# Patient Record
Sex: FEMALE | Race: BLACK OR AFRICAN AMERICAN | NOT HISPANIC OR LATINO | Employment: FULL TIME | URBAN - METROPOLITAN AREA
[De-identification: names, ages, dates, MRNs, and addresses within clinical notes are randomized per-mention and may not be internally consistent; named-entity substitution may affect disease eponyms.]

---

## 2017-08-26 ENCOUNTER — OFFICE VISIT (OUTPATIENT)
Dept: URGENT CARE | Facility: URGENT CARE | Age: 27
End: 2017-08-26
Payer: COMMERCIAL

## 2017-08-26 VITALS
WEIGHT: 190 LBS | TEMPERATURE: 98.4 F | DIASTOLIC BLOOD PRESSURE: 84 MMHG | BODY MASS INDEX: 28.63 KG/M2 | SYSTOLIC BLOOD PRESSURE: 146 MMHG | OXYGEN SATURATION: 96 % | HEART RATE: 96 BPM

## 2017-08-26 DIAGNOSIS — B96.89 BACTERIAL VAGINITIS: ICD-10-CM

## 2017-08-26 DIAGNOSIS — N89.8 VAGINAL DISCHARGE: Primary | ICD-10-CM

## 2017-08-26 DIAGNOSIS — N76.0 BACTERIAL VAGINITIS: ICD-10-CM

## 2017-08-26 LAB
SPECIMEN SOURCE: ABNORMAL
WET PREP SPEC: ABNORMAL

## 2017-08-26 PROCEDURE — 87210 SMEAR WET MOUNT SALINE/INK: CPT | Performed by: NURSE PRACTITIONER

## 2017-08-26 PROCEDURE — 99213 OFFICE O/P EST LOW 20 MIN: CPT | Performed by: NURSE PRACTITIONER

## 2017-08-26 RX ORDER — METRONIDAZOLE 500 MG/1
500 TABLET ORAL 2 TIMES DAILY
Qty: 14 TABLET | Refills: 0 | Status: SHIPPED | OUTPATIENT
Start: 2017-08-26 | End: 2017-09-02

## 2017-08-26 NOTE — NURSING NOTE
"Chief Complaint   Patient presents with     Vaginal Problem     Pt c/o vaginal problem.        Initial /84 (BP Location: Left arm, Patient Position: Chair, Cuff Size: Adult Regular)  Pulse 96  Temp 98.4  F (36.9  C) (Oral)  Wt 190 lb (86.2 kg)  SpO2 96%  Breastfeeding? No  BMI 28.63 kg/m2 Estimated body mass index is 28.63 kg/(m^2) as calculated from the following:    Height as of 3/20/16: 5' 8.31\" (1.735 m).    Weight as of this encounter: 190 lb (86.2 kg).  Medication Reconciliation: complete     Altagracia Allen CMA (AAMA)      "

## 2017-08-26 NOTE — MR AVS SNAPSHOT
After Visit Summary   2017    Aubree Monsivais    MRN: [id number]           Patient Information     Date Of Birth          1990        Visit Information        Provider Department      2017 9:05 AM Cami Meza NP [facility]        Today's Diagnoses     Vaginal discharge    -  1    Bacterial vaginitis          Care Instructions      Bacterial Vaginosis    You have a vaginal infection called bacterial vaginosis (BV). Both good and bad bacteria are present in a healthy vagina. BV occurs when these bacteria get out of balance. The number of bad bacteria increase. And the number of good bacteria decrease.  BV may or may not cause symptoms. If symptoms do occur, they can include:    Thin, gray, milky-white, or sometimes green discharge    Unpleasant odor or  fishy  smell    Itching, burning, or pain in or around the vagina  It is not known what causes BV, but certain factors can make the problem more likely. This can include:    Douching    Having sex with a new partner    Having sex with more than one partner  BV will sometimes go away on its own. But treatment is usually recommended. This is because untreated BV can increase the risk of more serious health problems such as:    Pelvic inflammatory disease (PID)     delivery (giving birth to a baby early if you re pregnant)    HIV and certain other sexually transmitted diseases (STDs)    Infection after surgery on the reproductive organs  Home care  General care    BV is most often treated with medicines called antibiotics. These may be given as pills or as a vaginal cream. If antibiotics are prescribed, be sure to use them exactly as directed. Also, be sure to complete all of the medicine, even if your symptoms go away.    Avoid douching or having sex during treatment.    If you have sex with a female partner, ask your healthcare provider if she should also be treated.  Prevention    Limit or avoid  douching.    Avoid having sex. If you do have sex, then take steps to lower your risk:    Use condoms when having sex.    Limit the number of partners you have sex with.  Follow-up care  Follow up with your healthcare provider, or as advised.  When to seek medical advice  Call your healthcare provider right away if:    You have a fever of 100.4 F (38 C) or higher, or as directed by your provider.    Your symptoms worsen, or they don t go away within a few days of starting treatment.    You have new pain in the lower belly or pelvic region.    You have side effects that bother you or a reaction to the pills or cream you re prescribed.    You or any partners you have sex with have new symptoms, such as a rash, joint pain, or sores.  Date Last Reviewed: 7/30/2015 2000-2017 [employer]. [address] [place] [address]. All rights reserved. This information is not intended as a substitute for professional medical care. Always follow your healthcare professional's instructions.                Follow-ups after your visit        Who to contact     If you have questions or need follow up information about today's clinic visit or your schedule please contact [facility] directly at [phone].  Normal or non-critical lab and imaging results will be communicated to you by MyChart, letter or phone within 4 business days after the clinic has received the results. If you do not hear from us within 7 days, please contact the clinic through [employer] or phone. If you have a critical or abnormal lab result, we will notify you by phone as soon as possible.  Submit refill requests through [employer] or call your pharmacy and they will forward the refill request to us. Please allow 3 business days for your refill to be completed.          Additional Information About Your Visit        [employer] Information     [employer] lets you send messages to your doctor, view your test results, renew your  "prescriptions, schedule appointments and more. To sign up, go to [email or web address] . Click on \"Log in\" on the left side of the screen, which will take you to the Welcome page. Then click on \"Sign up Now\" on the right side of the page.     You will be asked to enter the access code listed below, as well as some personal information. Please follow the directions to create your username and password.     Your access code is: [email or web address]  Expires: 2017  9:42 AM     Your access code will  in 90 days. If you need help or a new code, please call your [facility] clinic or [phone].        Care EveryWhere ID     This is your Care EveryWhere ID. This could be used by other organizations to access your [facility] medical records  [id number]        Your Vitals Were     Pulse Temperature Pulse Oximetry Breastfeeding? BMI (Body Mass Index)       96 98.4  F (36.9  C) (Oral) 96% No 28.63 kg/m2        Blood Pressure from Last 3 Encounters:   17 146/84   16 116/78   09 102/60    Weight from Last 3 Encounters:   17 190 lb (86.2 kg)   16 184 lb 3.2 oz (83.6 kg)   09 146 lb (66.2 kg) (79 %)*     * Growth percentiles are based on [facility] 2-20 Years data.              We Performed the Following     Wet prep          Today's Medication Changes          These changes are accurate as of: 17  9:42 AM.  If you have any questions, ask your nurse or doctor.               Start taking these medicines.        Dose/Directions    metroNIDAZOLE 500 MG tablet   Commonly known as:  FLAGYL   Used for:  Bacterial vaginitis   Started by:  Cami Meza NP        Dose:  500 mg   Take 1 tablet (500 mg) by mouth 2 times daily for 7 days   Quantity:  14 tablet   Refills:  0            Where to get your medicines      These medications were sent to [id number] - [facility], [place] - [address] [facility]  [address] [address]    Hours:  " 24-hours Phone:  [phone]     metroNIDAZOLE 500 MG tablet                Primary Care Provider    None Specified       No primary provider on file.        Equal Access to Services     CATHY PFEIFFER : Hadii aad ku hadkathyamanda Jessicajil, vida hedysharif, dickson mariee, huong karimein hayaan emanuelchata concepcion laOlesyaramon dano. So [facility] [phone].    ATENCIÓN: Si habla español, tiene a baca disposición servicios gratuitos de asistencia lingüística. Llame al [phone].    We comply with applicable federal civil rights laws and [place] laws. We do not discriminate on the basis of race, color, national origin, age, disability sex, sexual orientation or gender identity.            Thank you!     Thank you for choosing [facility]  for your care. Our goal is always to provide you with excellent care. Hearing back from our patients is one way we can continue to improve our services. Please take a few minutes to complete the written survey that you may receive in the mail after your visit with us. Thank you!             Your Updated Medication List - Protect others around you: Learn how to safely use, store and throw away your medicines at www.disposemymeds.org.          This list is accurate as of: 8/26/17  9:42 AM.  Always use your most recent med list.                   Brand Name Dispense Instructions for use Diagnosis    metroNIDAZOLE 500 MG tablet    FLAGYL    14 tablet    Take 1 tablet (500 mg) by mouth 2 times daily for 7 days    Bacterial vaginitis       MIRENA (52 MG) 20 MCG/24HR IUD   Generic drug:  levonorgestrel      None Entered        prenatal multivitamin plus iron 27-0.8 MG Tabs per tablet      Take 1 tablet by mouth daily

## 2017-08-26 NOTE — PATIENT INSTRUCTIONS
Bacterial Vaginosis    You have a vaginal infection called bacterial vaginosis (BV). Both good and bad bacteria are present in a healthy vagina. BV occurs when these bacteria get out of balance. The number of bad bacteria increase. And the number of good bacteria decrease.  BV may or may not cause symptoms. If symptoms do occur, they can include:    Thin, gray, milky-white, or sometimes green discharge    Unpleasant odor or  fishy  smell    Itching, burning, or pain in or around the vagina  It is not known what causes BV, but certain factors can make the problem more likely. This can include:    Douching    Having sex with a new partner    Having sex with more than one partner  BV will sometimes go away on its own. But treatment is usually recommended. This is because untreated BV can increase the risk of more serious health problems such as:    Pelvic inflammatory disease (PID)     delivery (giving birth to a baby early if you re pregnant)    HIV and certain other sexually transmitted diseases (STDs)    Infection after surgery on the reproductive organs  Home care  General care    BV is most often treated with medicines called antibiotics. These may be given as pills or as a vaginal cream. If antibiotics are prescribed, be sure to use them exactly as directed. Also, be sure to complete all of the medicine, even if your symptoms go away.    Avoid douching or having sex during treatment.    If you have sex with a female partner, ask your healthcare provider if she should also be treated.  Prevention    Limit or avoid douching.    Avoid having sex. If you do have sex, then take steps to lower your risk:    Use condoms when having sex.    Limit the number of partners you have sex with.  Follow-up care  Follow up with your healthcare provider, or as advised.  When to seek medical advice  Call your healthcare provider right away if:    You have a fever of 100.4 F (38 C) or higher, or as directed by your  provider.    Your symptoms worsen, or they don t go away within a few days of starting treatment.    You have new pain in the lower belly or pelvic region.    You have side effects that bother you or a reaction to the pills or cream you re prescribed.    You or any partners you have sex with have new symptoms, such as a rash, joint pain, or sores.  Date Last Reviewed: 7/30/2015 2000-2017 [employer]. [address] [place] [address], [place] [address]. All rights reserved. This information is not intended as a substitute for professional medical care. Always follow your healthcare professional's instructions.

## 2017-10-20 ENCOUNTER — OFFICE VISIT (OUTPATIENT)
Dept: URGENT CARE | Facility: URGENT CARE | Age: 27
End: 2017-10-20
Payer: COMMERCIAL

## 2017-10-20 VITALS
TEMPERATURE: 98.4 F | SYSTOLIC BLOOD PRESSURE: 121 MMHG | OXYGEN SATURATION: 100 % | HEART RATE: 63 BPM | BODY MASS INDEX: 28.63 KG/M2 | DIASTOLIC BLOOD PRESSURE: 74 MMHG | WEIGHT: 190 LBS

## 2017-10-20 DIAGNOSIS — N89.8 VAGINAL DISCHARGE: Primary | ICD-10-CM

## 2017-10-20 DIAGNOSIS — L24.9 IRRITANT CONTACT DERMATITIS, UNSPECIFIED TRIGGER: ICD-10-CM

## 2017-10-20 DIAGNOSIS — N76.0 BACTERIAL VAGINITIS: ICD-10-CM

## 2017-10-20 DIAGNOSIS — B96.89 BACTERIAL VAGINITIS: ICD-10-CM

## 2017-10-20 LAB
ALBUMIN UR-MCNC: NEGATIVE MG/DL
APPEARANCE UR: ABNORMAL
BILIRUB UR QL STRIP: NEGATIVE
COLOR UR AUTO: YELLOW
GLUCOSE UR STRIP-MCNC: NEGATIVE MG/DL
HGB UR QL STRIP: NEGATIVE
KETONES UR STRIP-MCNC: NEGATIVE MG/DL
LEUKOCYTE ESTERASE UR QL STRIP: NEGATIVE
NITRATE UR QL: NEGATIVE
NON-SQ EPI CELLS #/AREA URNS LPF: ABNORMAL /LPF
PH UR STRIP: 6 PH (ref 5–7)
RBC #/AREA URNS AUTO: ABNORMAL /HPF
SOURCE: ABNORMAL
SP GR UR STRIP: 1.02 (ref 1–1.03)
SPECIMEN SOURCE: ABNORMAL
UROBILINOGEN UR STRIP-ACNC: 0.2 EU/DL (ref 0.2–1)
WBC #/AREA URNS AUTO: ABNORMAL /HPF
WET PREP SPEC: ABNORMAL

## 2017-10-20 PROCEDURE — 81001 URINALYSIS AUTO W/SCOPE: CPT | Performed by: NURSE PRACTITIONER

## 2017-10-20 PROCEDURE — 87210 SMEAR WET MOUNT SALINE/INK: CPT | Performed by: NURSE PRACTITIONER

## 2017-10-20 PROCEDURE — 99214 OFFICE O/P EST MOD 30 MIN: CPT | Performed by: NURSE PRACTITIONER

## 2017-10-20 RX ORDER — PREDNISONE 20 MG/1
20 TABLET ORAL 2 TIMES DAILY
Qty: 10 TABLET | Refills: 0 | Status: SHIPPED | OUTPATIENT
Start: 2017-10-20 | End: 2017-10-25

## 2017-10-20 RX ORDER — METRONIDAZOLE 500 MG/1
500 TABLET ORAL 2 TIMES DAILY
Qty: 14 TABLET | Refills: 0 | Status: SHIPPED | OUTPATIENT
Start: 2017-10-20 | End: 2017-10-27

## 2017-10-20 RX ORDER — HYDROXYZINE HYDROCHLORIDE 25 MG/1
25-50 TABLET, FILM COATED ORAL EVERY 6 HOURS PRN
Qty: 42 TABLET | Refills: 0 | Status: SHIPPED | OUTPATIENT
Start: 2017-10-20 | End: 2017-10-27

## 2017-10-20 RX ORDER — TRIAMCINOLONE ACETONIDE 1 MG/G
CREAM TOPICAL
Qty: 80 G | Refills: 0 | Status: SHIPPED | OUTPATIENT
Start: 2017-10-20 | End: 2020-02-23

## 2017-10-20 NOTE — PATIENT INSTRUCTIONS
Bacterial Vaginosis    You have a vaginal infection called bacterial vaginosis (BV). Both good and bad bacteria are present in a healthy vagina. BV occurs when these bacteria get out of balance. The number of bad bacteria increase. And the number of good bacteria decrease.  BV may or may not cause symptoms. If symptoms do occur, they can include:    Thin, gray, milky-white, or sometimes green discharge    Unpleasant odor or  fishy  smell    Itching, burning, or pain in or around the vagina  It is not known what causes BV, but certain factors can make the problem more likely. This can include:    Douching    Having sex with a new partner    Having sex with more than one partner  BV will sometimes go away on its own. But treatment is usually recommended. This is because untreated BV can increase the risk of more serious health problems such as:    Pelvic inflammatory disease (PID)     delivery (giving birth to a baby early if you re pregnant)    HIV and certain other sexually transmitted diseases (STDs)    Infection after surgery on the reproductive organs  Home care  General care    BV is most often treated with medicines called antibiotics. These may be given as pills or as a vaginal cream. If antibiotics are prescribed, be sure to use them exactly as directed. Also, be sure to complete all of the medicine, even if your symptoms go away.    Avoid douching or having sex during treatment.    If you have sex with a female partner, ask your healthcare provider if she should also be treated.  Prevention    Limit or avoid douching.    Avoid having sex. If you do have sex, then take steps to lower your risk:    Use condoms when having sex.    Limit the number of partners you have sex with.  Follow-up care  Follow up with your healthcare provider, or as advised.  When to seek medical advice  Call your healthcare provider right away if:    You have a fever of 100.4 F (38 C) or higher, or as directed by your  "provider.    Your symptoms worsen, or they don t go away within a few days of starting treatment.    You have new pain in the lower belly or pelvic region.    You have side effects that bother you or a reaction to the pills or cream you re prescribed.    You or any partners you have sex with have new symptoms, such as a rash, joint pain, or sores.  Date Last Reviewed: 7/30/2015 2000-2017 [employer]. [address] [place] [address], [place] [address]. All rights reserved. This information is not intended as a substitute for professional medical care. Always follow your healthcare professional's instructions.        Contact Dermatitis  Contact dermatitis is a skin rash caused by something that touches the skin and makes it irritated and inflamed. Your skin may be red, swollen, dry, and may be cracked. Blisters may form and ooze. The rash will itch.  Contact dermatitis can form on the face and neck, backs of hands, forearms, genitals, and lower legs.  People can get contact dermatitis from lots of sources. These include:    Plants such as poison ivy, oak, or sumac    Chemicals in hair dyes and rinses, soaps, solvents, waxes, fingernail polish, and deodorants     Jewelry or watchbands made of nickel  Contact dermatitis is not passed from person to person.  Talk with your healthcare provider about what may have caused the rash. A type of allergy testing called \"patch testing\" may be used to discover what you are allergic to. You will need to avoid the source of your rash in the future to prevent it from coming back.  Treatment is done to relieve itching and prevent the rash from coming back. The rash should go away in a few days to a few weeks.  Home care  Your healthcare provider may prescribe medicine to relieve swelling and itching. Follow all instructions when using these medicines.  General care:    Avoid anything that heats up your skin, such as hot showers or baths, or direct sunlight. This can make " itching worse.    Apply cold compresses to soothe your sores to help relieve your symptoms. Do this for 30 minutes 3 to 4 times a day. You can make a cold compress by soaking a cloth in cold water. Squeeze out excess water. You can add colloidal oatmeal to the water to help reduce itching. For severe itching in a small area, apply an ice pack wrapped in a thin towel. Do this for 20 minutes 3 to 4 times a day.    You can also try wet dressings. One way to do this is to wear a wet piece of clothing under a dry one. Wear a damp shirt under a dry shirt if your upper body is affected. This can relieve itching and prevent you from scratching the affected area.    You can also help relieve large areas of itching by taking a lukewarm bath with colloidal oatmeal added to the water.    Use hydrocortisone cream for redness and irritation, unless another medicine was prescribed. You can also use benzocaine anesthetic cream or spray. Calamine lotion can also relieve mild symptoms.    Use oral diphenhydramine to help reduce itching. You can buy this antihistamine at drug and grocery stores. It can make you sleepy, so use lower doses during the daytime. Or you can use loratadine. This is an antihistamine that will not make you sleepy. Do not use diphenhydramine if you have glaucoma or have trouble urinating due to an enlarged prostate.    If a plant causes your rash, make sure to wash your skin and the clothes you were wearing when you came into contact with the plant. This is to wash away the plant oils that gave you the rash and prevent more or worse symptoms.    Stay away from the substance or object that causes your symptoms. If you can t avoid it, wear gloves or some other type of protection.  Follow-up care  Follow up with your healthcare provider, or as advised.  When to seek medical advice  Call your healthcare provider right away if any of these occur:    Spreading of the rash to other parts of your body    Severe  swelling of your face, eyelids, mouth, throat or tongue    Trouble urinating due to swelling in the genital area    Fever of 100.4 F (38 C) or higher    Redness or swelling that gets worse    Pain that gets worse    Foul-smelling fluid leaking from the skin    Yellow-brown crusts on the open blisters  Date Last Reviewed: 9/1/2016 2000-2017 [employer]. [address] [place] [address]. All rights reserved. This information is not intended as a substitute for professional medical care. Always follow your healthcare professional's instructions.

## 2017-10-20 NOTE — PROGRESS NOTES
"  SUBJECTIVE:   Aubree Monsivais is a 27 year old female who presents to clinic today for the following health issues:    Vaginal Symptoms      Duration: 4 days    Description  Vaginal discharge, itching, some \"stinging\" while urinating.     Intensity:  moderate    Accompanying signs and symptoms (fever/dysuria/abdominal or back pain): None    History  Sexually active: yes, single partner, contraception - oral contraceptives (combined)  Possibility of pregnancy: No  Recent antibiotic use: no     Precipitating or alleviating factors: None    Therapies tried and outcome: tried an OTC cream ( monistat?)- no relief.Outcome:         Rash      Duration: over 1.5 months    Description  Location: abdomen, upper arms, back, vagina. Has been spreading.   Itching: severe    Intensity:  Moderate-severe    Accompanying signs and symptoms: None    History (similar episodes/previous evaluation): None    Precipitating or alleviating factors:  New exposures:  None  Recent travel: no      Therapies tried and outcome: benadryl- some relief.        Problem list and histories reviewed & adjusted, as indicated.  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 B Streptococcus     Past Surgical History:   Procedure Laterality Date     NO HISTORY OF SURGERY         Social History   Substance Use Topics     Smoking status: Former Smoker     Smokeless tobacco: Not on file     Alcohol use No     Family History   Problem Relation Age of Onset     Hypertension Mother      DIABETES Father      Type II         Current Outpatient Prescriptions   Medication Sig Dispense Refill     DiphenhydrAMINE HCl (BENADRYL PO)        triamcinolone (KENALOG) 0.1 % cream Apply sparingly to affected area three times daily as needed 80 g 0     predniSONE (DELTASONE) 20 MG tablet Take 1 tablet (20 mg) by mouth 2 times daily for 5 days 10 tablet 0     hydrOXYzine (ATARAX) 25 MG tablet Take 1-2 " tablets (25-50 mg) by mouth every 6 hours as needed for itching 42 tablet 0     metroNIDAZOLE (FLAGYL) 500 MG tablet Take 1 tablet (500 mg) by mouth 2 times daily for 7 days 14 tablet 0     Prenatal Vit-Fe Fumarate-FA (PRENATAL MULTIVITAMIN  PLUS IRON) 27-0.8 MG TABS Take 1 tablet by mouth daily       MIRENA 20 MCG/24HR IU IUD None Entered       No Known Allergies      Reviewed and updated as needed this visit by clinical staff     Reviewed and updated as needed this visit by Provider         ROS:  C: NEGATIVE for fever, chills, change in weight  E/M: NEGATIVE for ear, mouth and throat problems  R: NEGATIVE for significant cough or SOB  CV: NEGATIVE for chest pain, palpitations or peripheral edema  : vaginal discharge  Skin: Positive for rash    OBJECTIVE:     /74 (BP Location: Left arm, Patient Position: Chair, Cuff Size: Adult Regular)  Pulse 63  Temp 98.4  F (36.9  C) (Oral)  Wt 190 lb (86.2 kg)  SpO2 100%  Breastfeeding? No  BMI 28.63 kg/m2  Body mass index is 28.63 kg/(m^2).  GENERAL: healthy, alert and no distress  NECK: no adenopathy, no asymmetry, masses, or scars and thyroid normal to palpation  RESP: lungs clear to auscultation - no rales, rhonchi or wheezes  CV: regular rate and rhythm, normal S1 S2, no S3 or S4, no murmur, click or rub, no peripheral edema and peripheral pulses strong  ABDOMEN: soft, nontender, no hepatosplenomegaly, no masses and bowel sounds normal  MS: no gross musculoskeletal defects noted, no edema  SKIN: Erythematous pruiritic maculopapular rashes with vesicles on the abdomen and back.     Diagnostic Test Results:  Results for orders placed or performed in visit on 10/20/17 (from the past 24 hour(s))   UA with Microscopic reflex to Culture   Result Value Ref Range    Color Urine Yellow     Appearance Urine Slightly Cloudy     Glucose Urine Negative NEG^Negative mg/dL    Bilirubin Urine Negative NEG^Negative    Ketones Urine Negative NEG^Negative mg/dL    Specific  Gravity Urine 1.025 1.003 - 1.035    pH Urine 6.0 5.0 - 7.0 pH    Protein Albumin Urine Negative NEG^Negative mg/dL    Urobilinogen Urine 0.2 0.2 - 1.0 EU/dL    Nitrite Urine Negative NEG^Negative    Blood Urine Negative NEG^Negative    Leukocyte Esterase Urine Negative NEG^Negative    Source Midstream Urine     WBC Urine O - 2 OTO2^O - 2 /HPF    RBC Urine O - 2 OTO2^O - 2 /HPF    Squamous Epithelial /LPF Urine Moderate (A) FEW^Few /LPF   Wet prep   Result Value Ref Range    Specimen Description Vagina     Wet Prep Clue cells seen (A)     Wet Prep No Trichomonas seen     Wet Prep No yeast seen     Wet Prep (Note)  SELF COLLECT.            ASSESSMENT/PLAN:       ICD-10-CM    1. Vaginal discharge N89.8 Wet prep     UA with Microscopic reflex to Culture   2. Irritant contact dermatitis, unspecified trigger L24.9 triamcinolone (KENALOG) 0.1 % cream     predniSONE (DELTASONE) 20 MG tablet     hydrOXYzine (ATARAX) 25 MG tablet   3. Bacterial vaginitis N76.0 metroNIDAZOLE (FLAGYL) 500 MG tablet    B96.89        I discussed lab results with the patient.  Advised absolute no alcohol while on flagyl  Advised follow up with PCP if rashes on skin are not resolving.   Patient educational/instructional material provided including reasons for follow-up    The patient indicates understanding of these issues and agrees with the plan.      Cami Meza NP  [facility]

## 2017-10-20 NOTE — NURSING NOTE
"Chief Complaint   Patient presents with     Vaginal Problem     Pt c/o vaginal problem and rash.        Initial /74 (BP Location: Left arm, Patient Position: Chair, Cuff Size: Adult Regular)  Pulse 63  Temp 98.4  F (36.9  C) (Oral)  Wt 190 lb (86.2 kg)  SpO2 100%  Breastfeeding? No  BMI 28.63 kg/m2 Estimated body mass index is 28.63 kg/(m^2) as calculated from the following:    Height as of 3/20/16: 5' 8.31\" (1.735 m).    Weight as of this encounter: 190 lb (86.2 kg).  Medication Reconciliation: complete     Altagracia Allen CMA (AAMA)      "

## 2017-10-20 NOTE — MR AVS SNAPSHOT
After Visit Summary   10/20/2017    Aubree Monsivais    MRN: [id number]           Patient Information     Date Of Birth          1990        Visit Information        Provider Department      10/20/2017 3:55 PM Cami Meza NP [facility]        Today's Diagnoses     Vaginal discharge    -  1    Irritant contact dermatitis, unspecified trigger        Bacterial vaginitis          Care Instructions      Bacterial Vaginosis    You have a vaginal infection called bacterial vaginosis (BV). Both good and bad bacteria are present in a healthy vagina. BV occurs when these bacteria get out of balance. The number of bad bacteria increase. And the number of good bacteria decrease.  BV may or may not cause symptoms. If symptoms do occur, they can include:    Thin, gray, milky-white, or sometimes green discharge    Unpleasant odor or  fishy  smell    Itching, burning, or pain in or around the vagina  It is not known what causes BV, but certain factors can make the problem more likely. This can include:    Douching    Having sex with a new partner    Having sex with more than one partner  BV will sometimes go away on its own. But treatment is usually recommended. This is because untreated BV can increase the risk of more serious health problems such as:    Pelvic inflammatory disease (PID)     delivery (giving birth to a baby early if you re pregnant)    HIV and certain other sexually transmitted diseases (STDs)    Infection after surgery on the reproductive organs  Home care  General care    BV is most often treated with medicines called antibiotics. These may be given as pills or as a vaginal cream. If antibiotics are prescribed, be sure to use them exactly as directed. Also, be sure to complete all of the medicine, even if your symptoms go away.    Avoid douching or having sex during treatment.    If you have sex with a female partner, ask your healthcare provider if she should also  "be treated.  Prevention    Limit or avoid douching.    Avoid having sex. If you do have sex, then take steps to lower your risk:    Use condoms when having sex.    Limit the number of partners you have sex with.  Follow-up care  Follow up with your healthcare provider, or as advised.  When to seek medical advice  Call your healthcare provider right away if:    You have a fever of 100.4 F (38 C) or higher, or as directed by your provider.    Your symptoms worsen, or they don t go away within a few days of starting treatment.    You have new pain in the lower belly or pelvic region.    You have side effects that bother you or a reaction to the pills or cream you re prescribed.    You or any partners you have sex with have new symptoms, such as a rash, joint pain, or sores.  Date Last Reviewed: 7/30/2015 2000-2017 [employer]. [address] [place] [address]. All rights reserved. This information is not intended as a substitute for professional medical care. Always follow your healthcare professional's instructions.        Contact Dermatitis  Contact dermatitis is a skin rash caused by something that touches the skin and makes it irritated and inflamed. Your skin may be red, swollen, dry, and may be cracked. Blisters may form and ooze. The rash will itch.  Contact dermatitis can form on the face and neck, backs of hands, forearms, genitals, and lower legs.  People can get contact dermatitis from lots of sources. These include:    Plants such as poison ivy, oak, or sumac    Chemicals in hair dyes and rinses, soaps, solvents, waxes, fingernail polish, and deodorants     Jewelry or watchbands made of nickel  Contact dermatitis is not passed from person to person.  Talk with your healthcare provider about what may have caused the rash. A type of allergy testing called \"patch testing\" may be used to discover what you are allergic to. You will need to avoid the source of your rash in the future to " prevent it from coming back.  Treatment is done to relieve itching and prevent the rash from coming back. The rash should go away in a few days to a few weeks.  Home care  Your healthcare provider may prescribe medicine to relieve swelling and itching. Follow all instructions when using these medicines.  General care:    Avoid anything that heats up your skin, such as hot showers or baths, or direct sunlight. This can make itching worse.    Apply cold compresses to soothe your sores to help relieve your symptoms. Do this for 30 minutes 3 to 4 times a day. You can make a cold compress by soaking a cloth in cold water. Squeeze out excess water. You can add colloidal oatmeal to the water to help reduce itching. For severe itching in a small area, apply an ice pack wrapped in a thin towel. Do this for 20 minutes 3 to 4 times a day.    You can also try wet dressings. One way to do this is to wear a wet piece of clothing under a dry one. Wear a damp shirt under a dry shirt if your upper body is affected. This can relieve itching and prevent you from scratching the affected area.    You can also help relieve large areas of itching by taking a lukewarm bath with colloidal oatmeal added to the water.    Use hydrocortisone cream for redness and irritation, unless another medicine was prescribed. You can also use benzocaine anesthetic cream or spray. Calamine lotion can also relieve mild symptoms.    Use oral diphenhydramine to help reduce itching. You can buy this antihistamine at drug and grocery stores. It can make you sleepy, so use lower doses during the daytime. Or you can use loratadine. This is an antihistamine that will not make you sleepy. Do not use diphenhydramine if you have glaucoma or have trouble urinating due to an enlarged prostate.    If a plant causes your rash, make sure to wash your skin and the clothes you were wearing when you came into contact with the plant. This is to wash away the plant oils that  gave you the rash and prevent more or worse symptoms.    Stay away from the substance or object that causes your symptoms. If you can t avoid it, wear gloves or some other type of protection.  Follow-up care  Follow up with your healthcare provider, or as advised.  When to seek medical advice  Call your healthcare provider right away if any of these occur:    Spreading of the rash to other parts of your body    Severe swelling of your face, eyelids, mouth, throat or tongue    Trouble urinating due to swelling in the genital area    Fever of 100.4 F (38 C) or higher    Redness or swelling that gets worse    Pain that gets worse    Foul-smelling fluid leaking from the skin    Yellow-brown crusts on the open blisters  Date Last Reviewed: 9/1/2016 2000-2017 The "[employer]". [address] [place] [address]. All rights reserved. This information is not intended as a substitute for professional medical care. Always follow your healthcare professional's instructions.                Follow-ups after your visit        Who to contact     If you have questions or need follow up information about today's clinic visit or your schedule please contact [facility] directly at [phone].  Normal or non-critical lab and imaging results will be communicated to you by Handyhart, letter or phone within 4 business days after the clinic has received the results. If you do not hear from us within 7 days, please contact the clinic through Handyhart or phone. If you have a critical or abnormal lab result, we will notify you by phone as soon as possible.  Submit refill requests through SKURA or call your pharmacy and they will forward the refill request to us. Please allow 3 business days for your refill to be completed.          Additional Information About Your Visit        HandyharPrimeloop Information     SKURA lets you send messages to your doctor, view your test results, renew your prescriptions,  "schedule appointments and more. To sign up, go to [email or web address] . Click on \"Log in\" on the left side of the screen, which will take you to the Welcome page. Then click on \"Sign up Now\" on the right side of the page.     You will be asked to enter the access code listed below, as well as some personal information. Please follow the directions to create your username and password.     Your access code is: [email or web address]  Expires: 2017  9:42 AM     Your access code will  in 90 days. If you need help or a new code, please call your [facility] clinic or [phone].        Care EveryWhere ID     This is your Care EveryWhere ID. This could be used by other organizations to access your [facility] medical records  [id number]        Your Vitals Were     Pulse Temperature Pulse Oximetry Breastfeeding? BMI (Body Mass Index)       63 98.4  F (36.9  C) (Oral) 100% No 28.63 kg/m2        Blood Pressure from Last 3 Encounters:   10/20/17 121/74   17 146/84   16 116/78    Weight from Last 3 Encounters:   10/20/17 190 lb (86.2 kg)   17 190 lb (86.2 kg)   16 184 lb 3.2 oz (83.6 kg)              We Performed the Following     UA with Microscopic reflex to Culture     Wet prep          Today's Medication Changes          These changes are accurate as of: 10/20/17  4:58 PM.  If you have any questions, ask your nurse or doctor.               Start taking these medicines.        Dose/Directions    hydrOXYzine 25 MG tablet   Commonly known as:  ATARAX   Used for:  Irritant contact dermatitis, unspecified trigger   Started by:  Cami Meza NP        Dose:  25-50 mg   Take 1-2 tablets (25-50 mg) by mouth every 6 hours as needed for itching   Quantity:  42 tablet   Refills:  0       metroNIDAZOLE 500 MG tablet   Commonly known as:  FLAGYL   Used for:  Bacterial vaginitis   Started by:  Cami Meza NP        Dose:  500 mg   Take 1 tablet (500 mg) by mouth 2 times daily for 7 days   Quantity:  14 " tablet   Refills:  0       predniSONE 20 MG tablet   Commonly known as:  DELTASONE   Used for:  Irritant contact dermatitis, unspecified trigger   Started by:  Cami Meza NP        Dose:  20 mg   Take 1 tablet (20 mg) by mouth 2 times daily for 5 days   Quantity:  10 tablet   Refills:  0       triamcinolone 0.1 % cream   Commonly known as:  KENALOG   Used for:  Irritant contact dermatitis, unspecified trigger   Started by:  Cami Meza NP        Apply sparingly to affected area three times daily as needed   Quantity:  80 g   Refills:  0            Where to get your medicines      These medications were sent to [facility] [address] [facility] [address] [facility] [facility]  [address] [facility] [address]    Hours:  24-hours Phone:  [phone]     hydrOXYzine 25 MG tablet    metroNIDAZOLE 500 MG tablet    predniSONE 20 MG tablet    triamcinolone 0.1 % cream                Primary Care Provider Office Phone # Fax #    [facility] [place] [phone]       [address]  [place] [address]        Equal Access to Services     CATHY PFEIFFER AH: Hadii aad ku hadasho Soomaali, waaxda luqadaha, qaybta kaalmada adeegyada, waxay idiin hayaan adechata khararafael matson. So [facility] [phone].    ATENCIÓN: Si habla español, tiene a baca disposición servicios gratuitos de asistencia lingüística. Epiame al [phone].    We comply with applicable federal civil rights laws and [place] laws. We do not discriminate on the basis of race, color, national origin, age, disability, sex, sexual orientation, or gender identity.            Thank you!     Thank you for choosing [facility]  for your care. Our goal is always to provide you with excellent care. Hearing back from our patients is one way we can continue to improve our services. Please take a few minutes to complete the written survey that you may receive in the mail after your visit  with us. Thank you!             Your Updated Medication List - Protect others around you: Learn how to safely use, store and throw away your medicines at www.disposemymeds.org.          This list is accurate as of: 10/20/17  4:58 PM.  Always use your most recent med list.                   Brand Name Dispense Instructions for use Diagnosis    BENADRYL PO           hydrOXYzine 25 MG tablet    ATARAX    42 tablet    Take 1-2 tablets (25-50 mg) by mouth every 6 hours as needed for itching    Irritant contact dermatitis, unspecified trigger       metroNIDAZOLE 500 MG tablet    FLAGYL    14 tablet    Take 1 tablet (500 mg) by mouth 2 times daily for 7 days    Bacterial vaginitis       MIRENA (52 MG) 20 MCG/24HR IUD   Generic drug:  levonorgestrel      None Entered        predniSONE 20 MG tablet    DELTASONE    10 tablet    Take 1 tablet (20 mg) by mouth 2 times daily for 5 days    Irritant contact dermatitis, unspecified trigger       prenatal multivitamin plus iron 27-0.8 MG Tabs per tablet      Take 1 tablet by mouth daily        triamcinolone 0.1 % cream    KENALOG    80 g    Apply sparingly to affected area three times daily as needed    Irritant contact dermatitis, unspecified trigger

## 2018-01-09 ENCOUNTER — OFFICE VISIT (OUTPATIENT)
Dept: URGENT CARE | Facility: URGENT CARE | Age: 28
End: 2018-01-09
Payer: COMMERCIAL

## 2018-01-09 VITALS
RESPIRATION RATE: 16 BRPM | SYSTOLIC BLOOD PRESSURE: 123 MMHG | WEIGHT: 194.2 LBS | HEART RATE: 73 BPM | TEMPERATURE: 98.5 F | DIASTOLIC BLOOD PRESSURE: 80 MMHG | OXYGEN SATURATION: 96 % | BODY MASS INDEX: 29.26 KG/M2

## 2018-01-09 DIAGNOSIS — Z11.3 SCREEN FOR STD (SEXUALLY TRANSMITTED DISEASE): Primary | ICD-10-CM

## 2018-01-09 PROCEDURE — 87591 N.GONORRHOEAE DNA AMP PROB: CPT | Performed by: FAMILY MEDICINE

## 2018-01-09 PROCEDURE — 87491 CHLMYD TRACH DNA AMP PROBE: CPT | Performed by: FAMILY MEDICINE

## 2018-01-09 PROCEDURE — 99213 OFFICE O/P EST LOW 20 MIN: CPT | Performed by: FAMILY MEDICINE

## 2018-01-09 ASSESSMENT — ENCOUNTER SYMPTOMS
HEMATURIA: 0
FLANK PAIN: 0
DYSURIA: 0
BRUISES/BLEEDS EASILY: 0
FEVER: 0
FREQUENCY: 0
COUGH: 0

## 2018-01-09 NOTE — MR AVS SNAPSHOT
"              After Visit Summary   2018    Aubree Monsivais    MRN: [id number]           Patient Information     Date Of Birth          1990        Visit Information        Provider Department      2018 5:55 PM Blake Crandall MD [facility]        Today's Diagnoses     Screen for STD (sexually transmitted disease)    -  1       Follow-ups after your visit        Who to contact     If you have questions or need follow up information about today's clinic visit or your schedule please contact [facility] directly at [phone].  Normal or non-critical lab and imaging results will be communicated to you by Interwisehart, letter or phone within 4 business days after the clinic has received the results. If you do not hear from us within 7 days, please contact the clinic through Interwisehart or phone. If you have a critical or abnormal lab result, we will notify you by phone as soon as possible.  Submit refill requests through CareParent or call your pharmacy and they will forward the refill request to us. Please allow 3 business days for your refill to be completed.          Additional Information About Your Visit        MyChart Information     CareParent lets you send messages to your doctor, view your test results, renew your prescriptions, schedule appointments and more. To sign up, go to [email or web address] . Click on \"Log in\" on the left side of the screen, which will take you to the Welcome page. Then click on \"Sign up Now\" on the right side of the page.     You will be asked to enter the access code listed below, as well as some personal information. Please follow the directions to create your username and password.     Your access code is: [email or web address]  Expires: 2018  6:52 PM     Your access code will  in 90 days. If you need help or a new code, please call your [facility] or [phone].        Care EveryWhere ID     This is your Care EveryWhere ID. " This could be used by other organizations to access your [facility] medical records  [id number]        Your Vitals Were     Pulse Temperature Respirations Pulse Oximetry Breastfeeding? BMI (Body Mass Index)    73 98.5  F (36.9  C) (Oral) 16 96% No 29.26 kg/m2       Blood Pressure from Last 3 Encounters:   01/09/18 123/80   10/20/17 121/74   08/26/17 146/84    Weight from Last 3 Encounters:   01/09/18 194 lb 3.2 oz (88.1 kg)   10/20/17 190 lb (86.2 kg)   08/26/17 190 lb (86.2 kg)              We Performed the Following     Chlamydia trachomatis PCR     Neisseria gonorrhoeae PCR        Primary Care Provider Office Phone # Fax #    [facility] [phone]       [address]  [facility] [address]        Equal Access to Services     PATRICIA PFEIFFER : Hadii aad ku hadasho Soomaali, waaxda luqadaha, qaybta kaalmada adeegyada, waxay karimein hayramon howard . So [facility] [phone].    ATENCIÓN: Si habla español, tiene a baca disposición servicios gratuitos de asistencia lingüística. Llame al [phone].    We comply with applicable federal civil rights laws and [place] laws. We do not discriminate on the basis of race, color, national origin, age, disability, sex, sexual orientation, or gender identity.            Thank you!     Thank you for choosing [facility]  for your care. Our goal is always to provide you with excellent care. Hearing back from our patients is one way we can continue to improve our services. Please take a few minutes to complete the written survey that you may receive in the mail after your visit with us. Thank you!             Your Updated Medication List - Protect others around you: Learn how to safely use, store and throw away your medicines at www.disposemymeds.org.          This list is accurate as of: 1/9/18  6:52 PM.  Always use your most recent med list.                   Brand Name Dispense Instructions for use Diagnosis     BENADRYL PO           MIRENA (52 MG) 20 MCG/24HR IUD   Generic drug:  levonorgestrel      None Entered        prenatal multivitamin plus iron 27-0.8 MG Tabs per tablet      Take 1 tablet by mouth daily        triamcinolone 0.1 % cream    KENALOG    80 g    Apply sparingly to affected area three times daily as needed    Irritant contact dermatitis, unspecified trigger

## 2018-01-10 NOTE — PROGRESS NOTES
SUBJECTIVE:   Aubree Monsivais is a 27 year old female presenting with a chief complaint of   Chief Complaint   Patient presents with     STD   .    Not having any current symptoms. , but has a new partner  Roughly 3x days ago. Apparently boyfriend was having dysuria and she is concerned he may have had chlamydia.   Course of illness is same.    Severity mild  Current and Associated symptoms: none  Treatment measures tried include None tried.  Predisposing factors include None and new partner has sx of urinating.  LMP: just finished couple day ago   mirena with regular periods.     Review of Systems   Constitutional: Negative for fever.   Respiratory: Negative for cough.    Genitourinary: Negative for dysuria, flank pain, frequency, hematuria and urgency.   Skin: Negative for rash.   Endo/Heme/Allergies: Does not bruise/bleed easily.     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 B Streptococcus        Past Medical History:   Diagnosis Date     NO ACTIVE PROBLEMS      Current Outpatient Prescriptions   Medication Sig Dispense Refill     DiphenhydrAMINE HCl (BENADRYL PO)        triamcinolone (KENALOG) 0.1 % cream Apply sparingly to affected area three times daily as needed 80 g 0     Prenatal Vit-Fe Fumarate-FA (PRENATAL MULTIVITAMIN  PLUS IRON) 27-0.8 MG TABS Take 1 tablet by mouth daily       MIRENA 20 MCG/24HR IU IUD None Entered       Social History   Substance Use Topics     Smoking status: Former Smoker     Smokeless tobacco: Not on file     Alcohol use No       OBJECTIVE  /80 (BP Location: Left arm, Patient Position: Chair, Cuff Size: Adult Large)  Pulse 73  Temp 98.5  F (36.9  C) (Oral)  Resp 16  Wt 194 lb 3.2 oz (88.1 kg)  SpO2 96%  Breastfeeding? No  BMI 29.26 kg/m2   Physical Exam   Constitutional: She is oriented to person, place, and time and well-developed, well-nourished, and in no distress.   HENT:   Head: Normocephalic and atraumatic.   Right Ear:  Tympanic membrane, external ear and ear canal normal.   Left Ear: Tympanic membrane, external ear and ear canal normal.   Mouth/Throat: Oropharynx is clear and moist. Mucous membranes are not dry. No posterior oropharyngeal erythema or tonsillar abscesses.   Eyes: EOM are normal. Right eye exhibits no discharge.   Neck: Normal range of motion. Neck supple.   Cardiovascular: Normal rate, regular rhythm and normal heart sounds.    Pulmonary/Chest: Effort normal and breath sounds normal.   Musculoskeletal: Normal range of motion.   Neurological: She is alert and oriented to person, place, and time. Gait normal.   Skin: Skin is warm and dry. She is not diaphoretic.   Psychiatric: Mood, memory, affect and judgment normal.       Labs:  No results found for this or any previous visit (from the past 24 hour(s)).      ASSESSMENT:    ICD-10-CM    1. Screen for STD (sexually transmitted disease) Z11.3 Chlamydia trachomatis PCR     Neisseria gonorrhoeae PCR       PLAN  Will call with test results. Empiric tx not indicated.   Patient educational/instructional material provided including reasons for follow-up    The patient indicates understanding of these issues and agrees with the plan.    Blake Crandall MD

## 2018-01-10 NOTE — NURSING NOTE
"Chief Complaint   Patient presents with     STD       Initial /80 (BP Location: Left arm, Patient Position: Chair, Cuff Size: Adult Large)  Pulse 73  Temp 98.5  F (36.9  C) (Oral)  Resp 16  Wt 194 lb 3.2 oz (88.1 kg)  SpO2 96%  Breastfeeding? No  BMI 29.26 kg/m2 Estimated body mass index is 29.26 kg/(m^2) as calculated from the following:    Height as of 3/20/16: 5' 8.31\" (1.735 m).    Weight as of this encounter: 194 lb 3.2 oz (88.1 kg).  Medication Reconciliation: complete     Xi Dawn MA      "

## 2018-01-11 LAB
C TRACH DNA SPEC QL NAA+PROBE: NEGATIVE
N GONORRHOEA DNA SPEC QL NAA+PROBE: NEGATIVE
SPECIMEN SOURCE: NORMAL
SPECIMEN SOURCE: NORMAL

## 2018-01-12 ENCOUNTER — TELEPHONE (OUTPATIENT)
Dept: URGENT CARE | Facility: URGENT CARE | Age: 28
End: 2018-01-12

## 2018-01-12 ENCOUNTER — NURSE TRIAGE (OUTPATIENT)
Dept: NURSING | Facility: CLINIC | Age: 28
End: 2018-01-12

## 2018-01-12 NOTE — TELEPHONE ENCOUNTER
Reason for Call:  Request for results:    Name of test or procedure: Neisseria Gonorrhoeae PCR, Chlamydia Trachomatis PCR    Date of test of procedure: 01/09/18    Location of the test or procedure: [facility]    OK to leave the result message on voice mail or with a family member? YES    Phone number Patient can be reached at:  Other phone number:  [phone] but can also be contacted at [phone]    Additional comments: Pt calling for she came in for a lab apt on 01/09/18 and would like a call back to discuss results.    Call taken on 1/12/2018 at 11:47 AM by Vinod Paniagua

## 2018-01-13 NOTE — TELEPHONE ENCOUNTER
Regarding: STD results   ----- Message from Temica Sandifer sent at 1/12/2018  8:13 PM CST -----  Reason for call:  Results   Name of test or procedure: STD results  Date of test or procedure: 1.9.18  Location of test or procedure: n/a    Additional comments: patient requesting STD test results    Phone number to reach patient:  [phone]    Best Time:  any    Can we leave a detailed message on this number?  Not Applicable

## 2018-01-13 NOTE — TELEPHONE ENCOUNTER
Patient calling for lab results. Results negative so shared with patient.    Component      Latest Ref Rng & Units 1/9/2018   Specimen Description       Urine   Chlamydia Trachomatis PCR      NEG:Negative Negative   Specimen Descrip       Urine   N Gonorrhea PCR      NEG:Negative Negative

## 2018-01-13 NOTE — TELEPHONE ENCOUNTER
INCOMPLETE CALL:    Outbound FNA triage call attempt x 1, no answer, left non detailed voicemail message for patient to call [facility] back.    Tammy Jimenez RN  [facility]

## 2020-02-23 ENCOUNTER — OFFICE VISIT (OUTPATIENT)
Dept: URGENT CARE | Facility: URGENT CARE | Age: 30
End: 2020-02-23

## 2020-02-23 VITALS
WEIGHT: 179 LBS | DIASTOLIC BLOOD PRESSURE: 77 MMHG | HEART RATE: 83 BPM | BODY MASS INDEX: 26.97 KG/M2 | SYSTOLIC BLOOD PRESSURE: 127 MMHG | RESPIRATION RATE: 18 BRPM | OXYGEN SATURATION: 100 % | TEMPERATURE: 98.3 F

## 2020-02-23 DIAGNOSIS — L21.0 PITYRIASIS: ICD-10-CM

## 2020-02-23 DIAGNOSIS — B36.0 TINEA VERSICOLOR: ICD-10-CM

## 2020-02-23 DIAGNOSIS — L24.9 IRRITANT CONTACT DERMATITIS, UNSPECIFIED TRIGGER: Primary | ICD-10-CM

## 2020-02-23 PROCEDURE — 99213 OFFICE O/P EST LOW 20 MIN: CPT | Performed by: PHYSICIAN ASSISTANT

## 2020-02-23 RX ORDER — SELENIUM SULFIDE 2.5 MG/100ML
1 LOTION TOPICAL DAILY PRN
Qty: 30 ML | Refills: 0 | Status: SHIPPED | OUTPATIENT
Start: 2020-02-23 | End: 2022-11-18

## 2020-02-23 RX ORDER — TRIAMCINOLONE ACETONIDE 1 MG/G
CREAM TOPICAL
Qty: 80 G | Refills: 0 | Status: SHIPPED | OUTPATIENT
Start: 2020-02-23 | End: 2022-11-18

## 2020-02-23 NOTE — LETTER
[facility]  [address]  [facility] [address]  Phone: [phone]    February 23, 2020        Aubree Monsivais  [address] [facility] [address]          To whom it may concern:    RE: Aubree Monsivais    Patient was seen and treated today at our clinic and missed work.    Please contact me for questions or concerns.      Sincerely,        Joan Yoder PA-C

## 2020-02-23 NOTE — PROGRESS NOTES
SUBJECTIVE:   Aubree Monsivais is a 29 year old female presenting with a chief complaint of   Chief Complaint   Patient presents with     Derm Problem     couple days       She is an established patient of [facility].    Rash    Onset of rash was 2 day(s) ago.   Course of illness is sudden onset.  Severity moderate  Current and Associated symptoms: itching   Location of the rash: abdomen, back and lower leg.  Previous history of a similar rash? Yes  Recent exposure history: none known  Denies exposure to: none known  Associated symptoms include: nothing.  Treatment measures tried include: steriod cream  Patient previously diagnosed with pityriasis and used triamcinalone with help, but returned.  She now has on her legs.          Review of Systems   Skin: Positive for rash.   All other systems reviewed and are negative.      Past Medical History:   Diagnosis Date     NO ACTIVE PROBLEMS      Family History   Problem Relation Age of Onset     Hypertension Mother      Diabetes Father         Type II     Current Outpatient Medications   Medication Sig Dispense Refill     MIRENA 20 MCG/24HR IU IUD None Entered       selenium sulfide (SELSUN) 2.5 % external lotion Apply 1 mL topically daily as needed for itching or irritation 30 mL 0     triamcinolone (KENALOG) 0.1 % external cream Apply sparingly to affected area three times daily as needed 80 g 0     DiphenhydrAMINE HCl (BENADRYL PO)        Prenatal Vit-Fe Fumarate-FA (PRENATAL MULTIVITAMIN  PLUS IRON) 27-0.8 MG TABS Take 1 tablet by mouth daily       Social History     Tobacco Use     Smoking status: Former Smoker     Smokeless tobacco: Never Used     Tobacco comment: passive smoker history   Substance Use Topics     Alcohol use: No       OBJECTIVE  /77 (BP Location: Left arm, Patient Position: Chair, Cuff Size: Adult Regular)   Pulse 83   Temp 98.3  F (36.8  C) (Oral)   Resp 18   Wt 81.2 kg (179 lb)   SpO2 100%   BMI 26.97 kg/m      Physical Exam  Vitals  "signs and nursing note reviewed.   Constitutional:       Appearance: Normal appearance. She is normal weight.   Skin:     Capillary Refill: Capillary refill takes less than 2 seconds.      Comments: Patient is AA and has multiple darkened oval macules to upper chest and back.  On her legs, some raised macules with erythema and excoriations.     Neurological:      General: No focal deficit present.      Mental Status: She is alert.   Psychiatric:         Mood and Affect: Mood normal.         Labs:  No results found for this or any previous visit (from the past 24 hour(s)).    X-Ray was not done.    ASSESSMENT:      ICD-10-CM    1. Irritant contact dermatitis, unspecified trigger L24.9 triamcinolone (KENALOG) 0.1 % external cream     selenium sulfide (SELSUN) 2.5 % external lotion   2. Pityriasis L21.0 selenium sulfide (SELSUN) 2.5 % external lotion   3. Tinea versicolor B36.0 DERMATOLOGY REFERRAL        Medical Decision Making:    Differential Diagnosis:  Pityriasis, tinea versicolor.    PLAN:    Rash:  triamcinalone and selson.  Dermatology referral for \"dark spots\"    Followup:    If not improving or if condition worsens, follow up with your Primary Care Provider    Patient Instructions     Patient Education     Pityriasis Rosea  This is a harmless non-contagious rash. The exact cause is unknown. It is not an allergic reaction, and does not seem to be caused by a viral or fungal infection. Although anyone can get it, it is most often seen in children and young adults ages 10 to 35.  Pityriasis usually resolves on its own without treatment in 2 weeks.  In some people it may take 6 to 8 weeks to clear up.   Home care    For dry skin, use a moisturizing cream. For itchiness, use 1% hydrocortisone cream (no prescription needed) or calamine lotion 2 to 3 times a day.    Exposure to natural sunlight helps some people. It may help fade the rash, but doesn't seem to help the itching. Don't overdo it in the sun, as your " skin may be more sensitive than usual. You don t want to burn yourself. Artificial sun lamps, sun beds, and tanning salons are not recommended.    This condition is not contagious. Your child may attend  or school while the rash is present.  Medicines  Talk with your healthcare provider before using any medicines. All medicines have side effects.    Medicines will not get rid of the rash.     Moisturizing skin creams may help.    Antihistamines may help with itching, but they can make you sleepy.    Topical steroids are sometimes used.  Follow-up care  Follow up with your healthcare provider, or as advised. Call your provider if the itching is not controlled by the above suggestions, or if the rash lasts longer than 3 months.  When to seek medical advice  Call your healthcare provider right away if any of these occur:    You develop a rash and are pregnant    Severe itching    Signs of infection in the skin (increasing redness, drainage of pus, yellow-brown scabs)    Fever or other symptoms of a new illness  Date Last Reviewed: 8/1/2016 2000-2019 [employer]. [address] [place] [address], [place] [address]. All rights reserved. This information is not intended as a substitute for professional medical care. Always follow your healthcare professional's instructions.

## 2020-02-23 NOTE — PATIENT INSTRUCTIONS
Patient Education     Pityriasis Rosea  This is a harmless non-contagious rash. The exact cause is unknown. It is not an allergic reaction, and does not seem to be caused by a viral or fungal infection. Although anyone can get it, it is most often seen in children and young adults ages 10 to 35.  Pityriasis usually resolves on its own without treatment in 2 weeks.  In some people it may take 6 to 8 weeks to clear up.   Home care    For dry skin, use a moisturizing cream. For itchiness, use 1% hydrocortisone cream (no prescription needed) or calamine lotion 2 to 3 times a day.    Exposure to natural sunlight helps some people. It may help fade the rash, but doesn't seem to help the itching. Don't overdo it in the sun, as your skin may be more sensitive than usual. You don t want to burn yourself. Artificial sun lamps, sun beds, and tanning salons are not recommended.    This condition is not contagious. Your child may attend  or school while the rash is present.  Medicines  Talk with your healthcare provider before using any medicines. All medicines have side effects.    Medicines will not get rid of the rash.     Moisturizing skin creams may help.    Antihistamines may help with itching, but they can make you sleepy.    Topical steroids are sometimes used.  Follow-up care  Follow up with your healthcare provider, or as advised. Call your provider if the itching is not controlled by the above suggestions, or if the rash lasts longer than 3 months.  When to seek medical advice  Call your healthcare provider right away if any of these occur:    You develop a rash and are pregnant    Severe itching    Signs of infection in the skin (increasing redness, drainage of pus, yellow-brown scabs)    Fever or other symptoms of a new illness  Date Last Reviewed: 8/1/2016 2000-2019 [employer]. [address] [place] [address], [place] [address]. All rights reserved. This information is not intended as a substitute  for professional medical care. Always follow your healthcare professional's instructions.

## 2022-10-31 ENCOUNTER — OFFICE VISIT (OUTPATIENT)
Dept: MIDWIFE SERVICES | Facility: CLINIC | Age: 32
End: 2022-10-31
Payer: MEDICAID

## 2022-10-31 ENCOUNTER — ANCILLARY PROCEDURE (OUTPATIENT)
Dept: ULTRASOUND IMAGING | Facility: CLINIC | Age: 32
End: 2022-10-31
Attending: ADVANCED PRACTICE MIDWIFE
Payer: MEDICAID

## 2022-10-31 VITALS
DIASTOLIC BLOOD PRESSURE: 68 MMHG | TEMPERATURE: 99 F | BODY MASS INDEX: 27.27 KG/M2 | HEART RATE: 67 BPM | SYSTOLIC BLOOD PRESSURE: 111 MMHG | WEIGHT: 181 LBS

## 2022-10-31 DIAGNOSIS — Z32.01 PREGNANCY TEST POSITIVE: ICD-10-CM

## 2022-10-31 DIAGNOSIS — Z34.82 ENCOUNTER FOR SUPERVISION OF OTHER NORMAL PREGNANCY, SECOND TRIMESTER: Primary | ICD-10-CM

## 2022-10-31 PROCEDURE — 76801 OB US < 14 WKS SINGLE FETUS: CPT | Performed by: OBSTETRICS & GYNECOLOGY

## 2022-10-31 PROCEDURE — 99202 OFFICE O/P NEW SF 15 MIN: CPT | Performed by: ADVANCED PRACTICE MIDWIFE

## 2022-10-31 NOTE — PROGRESS NOTES
Aubree Monsivais is here for dating confirmation US    Ultrasound shows a 13 5/7 week IUP consistant with her LMP dating of 2022.    Hx of  x 2.      Taking PNV and is keeping hydrated but has had difficulty eating due to taste decreased and states a weigh loss of 30#.       /68   Pulse 67   Temp 99  F (37.2  C)   Wt 82.1 kg (181 lb)   LMP 2022   BMI 27.27 kg/m       Wt was 214# on 22.   Appears hydrated and not in distress.  Reviewed as long as she is staying hydrated and not vomiting this is not effecting the pregnancy directly.      ASSESSMENT: 13w5d based on US consistant with LMP.  Wt loss in pregnancy of 30#.    PLAN: RTC in 3 wks for NOB.       Honorio Vargas APRN, CNM

## 2022-11-17 LAB
ABO/RH(D): NORMAL
ANTIBODY SCREEN: NEGATIVE
SPECIMEN EXPIRATION DATE: NORMAL

## 2022-11-18 ENCOUNTER — TRANSCRIBE ORDERS (OUTPATIENT)
Dept: MATERNAL FETAL MEDICINE | Facility: CLINIC | Age: 32
End: 2022-11-18

## 2022-11-18 ENCOUNTER — PRENATAL OFFICE VISIT (OUTPATIENT)
Dept: MIDWIFE SERVICES | Facility: CLINIC | Age: 32
End: 2022-11-18
Payer: MEDICAID

## 2022-11-18 VITALS
DIASTOLIC BLOOD PRESSURE: 65 MMHG | BODY MASS INDEX: 26.72 KG/M2 | WEIGHT: 177.3 LBS | HEART RATE: 71 BPM | SYSTOLIC BLOOD PRESSURE: 113 MMHG | OXYGEN SATURATION: 100 %

## 2022-11-18 DIAGNOSIS — O26.90 PREGNANCY RELATED CONDITION, ANTEPARTUM: Primary | ICD-10-CM

## 2022-11-18 DIAGNOSIS — Z34.82 ENCOUNTER FOR SUPERVISION OF OTHER NORMAL PREGNANCY, SECOND TRIMESTER: Primary | ICD-10-CM

## 2022-11-18 DIAGNOSIS — Z12.4 SCREENING FOR MALIGNANT NEOPLASM OF CERVIX: ICD-10-CM

## 2022-11-18 LAB
ALBUMIN UR-MCNC: NEGATIVE MG/DL
APPEARANCE UR: CLEAR
BACTERIA #/AREA URNS HPF: ABNORMAL /HPF
BILIRUB UR QL STRIP: NEGATIVE
COLOR UR AUTO: YELLOW
ERYTHROCYTE [DISTWIDTH] IN BLOOD BY AUTOMATED COUNT: 14.9 % (ref 10–15)
GLUCOSE UR STRIP-MCNC: NEGATIVE MG/DL
HBV SURFACE AG SERPL QL IA: NONREACTIVE
HCT VFR BLD AUTO: 32 % (ref 35–47)
HCV AB SERPL QL IA: NONREACTIVE
HGB BLD-MCNC: 10.9 G/DL (ref 11.7–15.7)
HGB UR QL STRIP: NEGATIVE
HIV 1+2 AB+HIV1 P24 AG SERPL QL IA: NONREACTIVE
KETONES UR STRIP-MCNC: 15 MG/DL
LEUKOCYTE ESTERASE UR QL STRIP: NEGATIVE
MCH RBC QN AUTO: 29.6 PG (ref 26.5–33)
MCHC RBC AUTO-ENTMCNC: 34.1 G/DL (ref 31.5–36.5)
MCV RBC AUTO: 87 FL (ref 78–100)
MUCOUS THREADS #/AREA URNS LPF: PRESENT /LPF
NITRATE UR QL: NEGATIVE
PH UR STRIP: 6 [PH] (ref 5–7)
PLATELET # BLD AUTO: 292 10E3/UL (ref 150–450)
RBC # BLD AUTO: 3.68 10E6/UL (ref 3.8–5.2)
RBC #/AREA URNS AUTO: ABNORMAL /HPF
SP GR UR STRIP: >=1.03 (ref 1–1.03)
SQUAMOUS #/AREA URNS AUTO: ABNORMAL /LPF
TSH SERPL DL<=0.005 MIU/L-ACNC: 2.26 UIU/ML (ref 0.3–4.2)
UROBILINOGEN UR STRIP-ACNC: 0.2 E.U./DL
WBC # BLD AUTO: 7.1 10E3/UL (ref 4–11)
WBC #/AREA URNS AUTO: ABNORMAL /HPF

## 2022-11-18 PROCEDURE — 87389 HIV-1 AG W/HIV-1&-2 AB AG IA: CPT | Performed by: ADVANCED PRACTICE MIDWIFE

## 2022-11-18 PROCEDURE — 85027 COMPLETE CBC AUTOMATED: CPT | Performed by: ADVANCED PRACTICE MIDWIFE

## 2022-11-18 PROCEDURE — 99213 OFFICE O/P EST LOW 20 MIN: CPT | Performed by: ADVANCED PRACTICE MIDWIFE

## 2022-11-18 PROCEDURE — 86762 RUBELLA ANTIBODY: CPT | Performed by: ADVANCED PRACTICE MIDWIFE

## 2022-11-18 PROCEDURE — 87086 URINE CULTURE/COLONY COUNT: CPT | Performed by: ADVANCED PRACTICE MIDWIFE

## 2022-11-18 PROCEDURE — 87591 N.GONORRHOEAE DNA AMP PROB: CPT | Performed by: ADVANCED PRACTICE MIDWIFE

## 2022-11-18 PROCEDURE — 87491 CHLMYD TRACH DNA AMP PROBE: CPT | Performed by: ADVANCED PRACTICE MIDWIFE

## 2022-11-18 PROCEDURE — 86780 TREPONEMA PALLIDUM: CPT | Performed by: ADVANCED PRACTICE MIDWIFE

## 2022-11-18 PROCEDURE — 87340 HEPATITIS B SURFACE AG IA: CPT | Performed by: ADVANCED PRACTICE MIDWIFE

## 2022-11-18 PROCEDURE — G0145 SCR C/V CYTO,THINLAYER,RESCR: HCPCS | Performed by: ADVANCED PRACTICE MIDWIFE

## 2022-11-18 PROCEDURE — 86850 RBC ANTIBODY SCREEN: CPT | Performed by: ADVANCED PRACTICE MIDWIFE

## 2022-11-18 PROCEDURE — 84443 ASSAY THYROID STIM HORMONE: CPT | Performed by: ADVANCED PRACTICE MIDWIFE

## 2022-11-18 PROCEDURE — 87624 HPV HI-RISK TYP POOLED RSLT: CPT | Performed by: ADVANCED PRACTICE MIDWIFE

## 2022-11-18 PROCEDURE — 86900 BLOOD TYPING SEROLOGIC ABO: CPT | Performed by: ADVANCED PRACTICE MIDWIFE

## 2022-11-18 PROCEDURE — 86803 HEPATITIS C AB TEST: CPT | Performed by: ADVANCED PRACTICE MIDWIFE

## 2022-11-18 PROCEDURE — 36415 COLL VENOUS BLD VENIPUNCTURE: CPT | Performed by: ADVANCED PRACTICE MIDWIFE

## 2022-11-18 PROCEDURE — 86901 BLOOD TYPING SEROLOGIC RH(D): CPT | Performed by: ADVANCED PRACTICE MIDWIFE

## 2022-11-18 PROCEDURE — 81001 URINALYSIS AUTO W/SCOPE: CPT | Performed by: ADVANCED PRACTICE MIDWIFE

## 2022-11-18 RX ORDER — FLUOCINONIDE 0.5 MG/G
OINTMENT TOPICAL
COMMUNITY
Start: 2022-10-18

## 2022-11-18 NOTE — PROGRESS NOTES
16w2d   Aubree Monsivais is a 32 year old who presents to the clinic for an new ob visit.  She is not a previous CNM patient. Aubree has been struggling to eat. She has many food aversions, and nothing tastes good. She has lost about 30 pounds since she became pregnant. She has been occasionally smoking marijuana to help with her appetite. Is working to figure out what foods she is able to eat to increase her intake, planning to stop smoking now.   Estimated Date of Delivery: May 3, 2023 is calculated from Patient's last menstrual period was 07/27/2022. Verified SUN with 13w U/S.    She has not had bleeding since her LMP.   She has had mild nausea. Weigh loss has occurred, for a total of 30 pounds. Having food aversions and bad taste in her mouth. Eating as much as she can.  This was not a planned pregnancy.   FOB is involved,  Melvin   OTHER CONCERNS: weight loss/eating    INFECTION HISTORY  HIV: no  Hepatitis B: no  Hepatitis C: no  Syphilis:  no  Tuberculosis: no   PPD- no  Herpes self: no  Herpes partner:  no  Chlamydia:  no  Gonorrhea:  no  HPV: no  BV:  yes  Trichomonis:  no  Chicken Pox:  YES-as a child  ====================================================  GENETIC SCREENING  Genetic screening reviewed. High Risk? no  ====================================================  PERSONAL/SOCIAL HISTORY  Lives lives with their family.  Employment: Full time.  Her job involves sedentary activity .  HX OF ABUSE: no  =====================================================   REVIEW OF SYSTEMS  CONSTITUTIONAL: NEGATIVE for fever, chills  INTEGUMENTARY/SKIN: POSITIVE for rash legs  EYES: NEGATIVE for vision changes   ENT/MOUTH: NEGATIVE for ear, mouth and throat problems  RESP: NEGATIVE for significant cough or SOB  CV: NEGATIVE for chest pain, palpitations   GI: NEGATIVE for nausea, abdominal pain, heartburn, or change in bowel habits  GI: POSITIVE for nausea, poor appetite and weight loss and NEGATIVE for constipation,  diarrhea and vomiting  : NEGATIVE for frequency, dysuria, or hematuria  MUSCULOSKELETAL: NEGATIVE for significant arthralgias or myalgia  NEURO: NEGATIVE for weakness, dizziness or paresthesias or headache  PSYCHIATRIC: NEGATIVE for changes in mood or affect  ====================================================    PHYSICAL EXAM:  LMP 2022   BMI- Body mass index is 26.72 kg/m .,     RECOMMENDED WEIGHT GAIN: 25-35 lbs.  PHQ9- Today's Depression Rating was 2  GENERAL:  Pleasant pregnant female, alert, well groomed.   SKIN:  Warm and dry, without lesions or rashes  HEAD: Symmetrical features.  EYES:  PERRLA,   MOUTH:  Buccal mucosa pink, moist without lesions.    NECK:  Thyroid without enlargement and nodules.  Lymph nodes not palpable.   LUNGS:  Clear to auscultation.  HEART:  RRR without murmur.  ABDOMEN: Soft without masses , tenderness or organomegaly.  No CVA tenderness. No scars noted.     MUSCULOSKELETAL:  Full range of motion  EXTREMITIES:  No edema. No significant varicosities.     GENITALIA:  BUS WNL, no lesions noted   VAGINA:  Pink, normal rugae and discharge normal and physiologic  CERVIX:  smooth, without discharge or CMT and parous os,   firm/ closed, long. PAP collected.    =========================================  ASSESSMENT:  16w2d,   (Z34.82) Encounter for supervision of other normal pregnancy, second trimester  (primary encounter diagnosis)  Plan: [facility] Referral - Pregnancy, US OB >        14 Weeks, ABO/Rh type and screen, HIV Antigen         Antibody Combo, Rubella Antibody IgG, Treponema        Abs w Reflex to RPR and Titer, Hepatitis B         surface antigen, Hepatitis C antibody, CBC with        platelets, TSH with free T4 reflex, NEISSERIA         GONORRHOEA PCR, CHLAMYDIA TRACHOMATIS PCR,         Urine Culture Aerobic Bacterial - lab collect,         UA with Microscopic reflex to Culture - lab         collect, Urine Microscopic    (Z12.4) Screening for  malignant neoplasm of cervix  Plan: PAP imaged thin layer screen    PREGNANCY AT RISK? Yes-poor diet/weight loss  ==========================================  PLAN:  Instructed on use of triage nurse line and contacting the on call CNM after hours for an urgent need such as fever, vagina bleeding, bladder or vaginal infection, rupture of membranes,  or term labor.    Discussed the indications, uses for and false positives for quad screen, nuchal translucency and fetal survey ultrasound at 18-20 weeks gestation. M referral sent.  Instructed on best evidence for: weight gain for her BMI for pregnancy; healthy diet and foods to avoid; exercise and activity during pregnancy;avoiding exposure to toxoplasmosis; and maintenance of a generally healthy lifestyle.   Discussed the harms, benefits, side effects and alternative therapies for current prescribed and OTC medications.    Yoseph Roth CNM

## 2022-11-19 LAB
C TRACH DNA SPEC QL NAA+PROBE: NEGATIVE
N GONORRHOEA DNA SPEC QL NAA+PROBE: NEGATIVE
T PALLIDUM AB SER QL: NONREACTIVE

## 2022-11-20 LAB — BACTERIA UR CULT: NORMAL

## 2022-11-21 LAB
RUBV IGG SERPL QL IA: 1.32 INDEX
RUBV IGG SERPL QL IA: POSITIVE

## 2022-11-22 LAB
BKR LAB AP GYN ADEQUACY: NORMAL
BKR LAB AP GYN INTERPRETATION: NORMAL
BKR LAB AP HPV REFLEX: NORMAL
BKR LAB AP PREVIOUS ABNORMAL: NORMAL
PATH REPORT.COMMENTS IMP SPEC: NORMAL
PATH REPORT.COMMENTS IMP SPEC: NORMAL
PATH REPORT.RELEVANT HX SPEC: NORMAL

## 2022-11-23 LAB
HUMAN PAPILLOMA VIRUS 16 DNA: NEGATIVE
HUMAN PAPILLOMA VIRUS 18 DNA: NEGATIVE
HUMAN PAPILLOMA VIRUS FINAL DIAGNOSIS: NORMAL
HUMAN PAPILLOMA VIRUS OTHER HR: NEGATIVE

## 2022-12-02 ENCOUNTER — ANCILLARY PROCEDURE (OUTPATIENT)
Dept: ULTRASOUND IMAGING | Facility: CLINIC | Age: 32
End: 2022-12-02
Attending: ADVANCED PRACTICE MIDWIFE
Payer: MEDICAID

## 2022-12-02 ENCOUNTER — TRANSCRIBE ORDERS (OUTPATIENT)
Dept: MATERNAL FETAL MEDICINE | Facility: CLINIC | Age: 32
End: 2022-12-02

## 2022-12-02 ENCOUNTER — PRENATAL OFFICE VISIT (OUTPATIENT)
Dept: MIDWIFE SERVICES | Facility: CLINIC | Age: 32
End: 2022-12-02
Attending: ADVANCED PRACTICE MIDWIFE

## 2022-12-02 ENCOUNTER — PRE VISIT (OUTPATIENT)
Dept: MATERNAL FETAL MEDICINE | Facility: CLINIC | Age: 32
End: 2022-12-02

## 2022-12-02 VITALS
OXYGEN SATURATION: 99 % | WEIGHT: 173 LBS | DIASTOLIC BLOOD PRESSURE: 69 MMHG | HEART RATE: 71 BPM | BODY MASS INDEX: 26.07 KG/M2 | SYSTOLIC BLOOD PRESSURE: 121 MMHG

## 2022-12-02 DIAGNOSIS — Z34.82 ENCOUNTER FOR SUPERVISION OF OTHER NORMAL PREGNANCY, SECOND TRIMESTER: ICD-10-CM

## 2022-12-02 DIAGNOSIS — O26.849 FETAL SIZE INCONSISTENT WITH DATES: Primary | ICD-10-CM

## 2022-12-02 DIAGNOSIS — O36.5920 INTRAUTERINE GROWTH RESTRICTION (IUGR) AFFECTING CARE OF MOTHER, SECOND TRIMESTER, SINGLE GESTATION: ICD-10-CM

## 2022-12-02 DIAGNOSIS — O26.12 POOR WEIGHT GAIN OF PREGNANCY, SECOND TRIMESTER: ICD-10-CM

## 2022-12-02 DIAGNOSIS — O26.90 PREGNANCY RELATED CONDITION: Primary | ICD-10-CM

## 2022-12-02 PROCEDURE — 76816 OB US FOLLOW-UP PER FETUS: CPT | Performed by: OBSTETRICS & GYNECOLOGY

## 2022-12-02 PROCEDURE — 99213 OFFICE O/P EST LOW 20 MIN: CPT | Performed by: ADVANCED PRACTICE MIDWIFE

## 2022-12-02 NOTE — PROGRESS NOTES
Aubree is here with partner Melvin.  She is here for fetal survey at 18 2/7 but fetus measuring at 16 6/7 so fetal survey could not be preformed.  Placenta documented as appearing abnormal on prelim report with multiple cyst and lakes noted.    Aubree had dating US done at 13 weeks that was consistant with her LMP dates so early lag is concerning.  Aubree is also suffering from food aversion with wt loss.  Had the same with her 2nd pregnancy that lasted longer.  Pt was 214# on 5/4/2022.  Reported her prepregnancy wt at 210#.  Wt at 13 wks was down 30#.  16wks was 177# and 173# at 18wks for the 37# documented wt loss.  Has been able to keep hydrated.    Body mass index is 26.07 kg/m . today from 31.64 pre pregnancy.    Aubree has been using marijuana to try to stimulate her appetite.  She feels like the appetite is increasing over the last 5 days.  Denies any vomiting related to this wt loss.  ASSESSMENT: 18w2d with 1 wk 3 day lag by US.  Appetite suppression and wt loss,  Abnormal placenta appearance on US.    PLAN: MFM referral.  Reviewed with Aubree and Melvin the concern of seeing a growth difference this early in pregnancy could be related to the placenta function with some suspecion of that based on US report on appearance, could be genetic related so additional test for that maybe required, related to her nutritional status is possible but not likely,     Honorio Vargas APRCAROLINA, CNM

## 2022-12-06 ENCOUNTER — OFFICE VISIT (OUTPATIENT)
Dept: MATERNAL FETAL MEDICINE | Facility: CLINIC | Age: 32
End: 2022-12-06
Attending: OBSTETRICS & GYNECOLOGY
Payer: MEDICAID

## 2022-12-06 ENCOUNTER — HOSPITAL ENCOUNTER (OUTPATIENT)
Dept: ULTRASOUND IMAGING | Facility: CLINIC | Age: 32
Discharge: HOME | End: 2022-12-06
Attending: OBSTETRICS & GYNECOLOGY
Payer: MEDICAID

## 2022-12-06 ENCOUNTER — LAB (OUTPATIENT)
Dept: LAB | Facility: CLINIC | Age: 32
End: 2022-12-06
Attending: OBSTETRICS & GYNECOLOGY
Payer: MEDICAID

## 2022-12-06 DIAGNOSIS — O26.90 PREGNANCY RELATED CONDITION: ICD-10-CM

## 2022-12-06 DIAGNOSIS — O36.5990 FETAL GROWTH RESTRICTION ANTEPARTUM: Primary | ICD-10-CM

## 2022-12-06 DIAGNOSIS — O28.3 ABNORMAL PRENATAL ULTRASOUND: Primary | ICD-10-CM

## 2022-12-06 DIAGNOSIS — O28.3 ABNORMAL PRENATAL ULTRASOUND: ICD-10-CM

## 2022-12-06 PROBLEM — O36.5920 INTRAUTERINE GROWTH RESTRICTION (IUGR) AFFECTING CARE OF MOTHER, SECOND TRIMESTER, SINGLE GESTATION: Status: ACTIVE | Noted: 2022-12-06

## 2022-12-06 PROBLEM — O26.12 POOR WEIGHT GAIN OF PREGNANCY, SECOND TRIMESTER: Status: ACTIVE | Noted: 2022-12-06

## 2022-12-06 PROCEDURE — 36415 COLL VENOUS BLD VENIPUNCTURE: CPT

## 2022-12-06 PROCEDURE — 76820 UMBILICAL ARTERY ECHO: CPT | Mod: 26 | Performed by: OBSTETRICS & GYNECOLOGY

## 2022-12-06 PROCEDURE — 76811 OB US DETAILED SNGL FETUS: CPT | Mod: 26 | Performed by: OBSTETRICS & GYNECOLOGY

## 2022-12-06 PROCEDURE — 999N000069 HC STATISTIC GENETIC COUNSELING, < 16 MIN: Performed by: GENETIC COUNSELOR, MS

## 2022-12-06 PROCEDURE — 76811 OB US DETAILED SNGL FETUS: CPT

## 2022-12-06 NOTE — PROGRESS NOTES
"Please see \"Imaging\" tab under \"Chart Review\" for details of today's US at the [facility].    Greyson Ramírez MD  Maternal-Fetal Medicine      "

## 2022-12-06 NOTE — PROGRESS NOTES
[facility]  Genetic Counseling Consult    Patient:  Aubree Monsivais YOB: 1990   Date of Service:  22      Aubree Monsivais was seen at the [facility] for genetic consultation as part of her appointment for comprehensive ultrasound.  The indication for genetic counseling is fetal growth restriction.       Impression/Plan:   1. Aubree has not had serum screening in this pregnancy.     2. Aubree had a comprehensive (level II) ultrasound today which confirmed the finding of fetal growth restriction.  Please see the ultrasound report for further details.    3. The patient had a blood draw for NIPT (Panorama test through [facility]).  Results are expected within 7-10 days, and will be available in [facility].  We will contact her to discuss the results, and a copy will be forwarded to the office of the referring OB provider. Aubree requests detailed results be left in her voicemail if she cannot be reached, and understands results will be available in [facility] as well.    Pregnancy History:   /Parity:    Age at Delivery: 33 year old  SUN: 5/3/2023, by Last Menstrual Period  Gestational Age: 18w6d    No significant complications or exposures were reported in the current pregnancy.      Medical History:   Aubree s reported medical history is not expected to impact pregnancy management or risks to fetal development.          Risk Assessment for Chromosome Conditions:   We explained that the risk for fetal chromosome abnormalities increases with maternal age. We discussed specific features of common chromosome abnormalities, including Down syndrome, trisomy 13, trisomy 18, and sex chromosome trisomies.      - At age 32 at midtrimester, the risk to have a baby with Down syndrome is 1 in 508.     - At age 32 at midtrimester, the risk to have a baby with any chromosome abnormality is 1 in 254.       Aubree did not have  maternal serum screening earlier in pregnancy.    The primary focus of today's genetic counseling encounter was discussing the implications of fetal growth restriction for the pregnancy. We discussed that fetal growth restriction can be associated with chromosome abnormalities affecting pregnancy, and that abnormal appearing placentas can be associated with a specific chromosome abnormality, triploidy.  We discussed the severe/lethal prognosis associated with triploidy, trisomy 18, and trisomy 13, as well as the more broad spectrum of outcomes associated with Down syndrome. After reviewing the benefits and limitations of screening and diagnostic testing, Aubree opted to proceed with NIPT.  Because of the specific concern for triploidy, testing was ordered through [employer], as their platform allows for assessment of risk for triploidy as well as the other chromosome abnormalities.       Testing Options:   We discussed the following options:   Non-invasive Prenatal Testing (NIPT)    Maternal plasma cell-free DNA testing; first trimester ultrasound with nuchal translucency and nasal bone assessment is recommended, when appropriate    Screens for fetal trisomy 21, trisomy 13, trisomy 18, and sex chromosome aneuploidy    Cannot screen for open neural tube defects; maternal serum AFP after 15 weeks is recommended       Genetic Amniocentesis    Invasive procedure typically performed in the second trimester by which amniotic fluid is obtained for the purpose of chromosome analysis and/or other prenatal genetic analysis    Diagnostic results; >99% sensitivity for fetal chromosome abnormalities    AFAFP measurement tests for open neural tube defects       Comprehensive (Level II) ultrasound: Detailed ultrasound performed between 18-22 weeks gestation to screen for major birth defects and markers for aneuploidy.        We reviewed the benefits and limitations of this testing.  Screening tests provide a risk assessment specific  to the pregnancy for certain fetal chromosome abnormalities, but cannot definitively diagnose or exclude a fetal chromosome abnormality.  Follow-up genetic counseling and consideration of diagnostic testing is recommended with any abnormal screening result.     Diagnostic tests carry inherent risks- including risk of miscarriage- that require careful consideration.  These tests can detect fetal chromosome abnormalities with greater than 99% certainty.  Results can be compromised by maternal cell contamination or mosaicism, and are limited by the resolution of cytogenetic G-banding technology.  There is no screening nor diagnostic test that can detect all forms of birth defects or mental disability.    It was a pleasure to be involved with [facility]. Face-to-face time of the meeting was 15 minutes.    Broderick Rodriguez MS, [facility]  Licensed Genetic Counselor  Phone: [phone]  Pager: [phone]

## 2022-12-12 ENCOUNTER — TELEPHONE (OUTPATIENT)
Dept: MATERNAL FETAL MEDICINE | Facility: CLINIC | Age: 32
End: 2022-12-12

## 2022-12-12 LAB — SCANNED LAB RESULT: NORMAL

## 2022-12-12 NOTE — TELEPHONE ENCOUNTER
December 12, 2022    I left a voicemail for Aubree regarding her low risk Panorama NIPT results.     Aubree underwent Panorama NIPT in the current pregnancy and results returned low risk for trisomy 21, trisomy 18, trisomy 13, monosomy X, and triploidy.     This information was left in Aubree's voicemail per plan established at her genetic counseling appointment, and Aubree was encouraged to reach out if she has any questions or concerns.     Teresa Hanley MS, [facility]  Licensed Genetic Counselor  [facility]  Maternal Fetal Medicine  Ph: [phone]  [email or web address]

## 2022-12-20 ENCOUNTER — HOSPITAL ENCOUNTER (OUTPATIENT)
Dept: ULTRASOUND IMAGING | Facility: CLINIC | Age: 32
Discharge: HOME | End: 2022-12-20
Attending: OBSTETRICS & GYNECOLOGY
Payer: MEDICAID

## 2022-12-20 ENCOUNTER — OFFICE VISIT (OUTPATIENT)
Dept: MATERNAL FETAL MEDICINE | Facility: CLINIC | Age: 32
End: 2022-12-20
Attending: STUDENT IN AN ORGANIZED HEALTH CARE EDUCATION/TRAINING PROGRAM
Payer: MEDICAID

## 2022-12-20 ENCOUNTER — OFFICE VISIT (OUTPATIENT)
Dept: MATERNAL FETAL MEDICINE | Facility: CLINIC | Age: 32
End: 2022-12-20
Attending: OBSTETRICS & GYNECOLOGY
Payer: MEDICAID

## 2022-12-20 VITALS — SYSTOLIC BLOOD PRESSURE: 122 MMHG | DIASTOLIC BLOOD PRESSURE: 64 MMHG

## 2022-12-20 DIAGNOSIS — O35.9XX0 PREGNANCY AFFECTED BY MULTIPLE CONGENITAL ANOMALIES OF FETUS, SINGLE OR UNSPECIFIED FETUS: ICD-10-CM

## 2022-12-20 DIAGNOSIS — Z31.5 ENCOUNTER FOR PROCREATIVE GENETIC COUNSELING AND TESTING: ICD-10-CM

## 2022-12-20 DIAGNOSIS — O36.5990 FETAL GROWTH RESTRICTION ANTEPARTUM: ICD-10-CM

## 2022-12-20 DIAGNOSIS — O35.9XX0 FETAL ABNORMALITY AFFECTING MANAGEMENT OF MOTHER, SINGLE OR UNSPECIFIED FETUS: Primary | ICD-10-CM

## 2022-12-20 DIAGNOSIS — O36.5990 FETAL GROWTH RESTRICTION ANTEPARTUM: Primary | ICD-10-CM

## 2022-12-20 DIAGNOSIS — O35.9XX0 FETAL ABNORMALITY AFFECTING MANAGEMENT OF MOTHER, SINGLE OR UNSPECIFIED FETUS: ICD-10-CM

## 2022-12-20 PROCEDURE — 76816 OB US FOLLOW-UP PER FETUS: CPT | Mod: 26 | Performed by: STUDENT IN AN ORGANIZED HEALTH CARE EDUCATION/TRAINING PROGRAM

## 2022-12-20 PROCEDURE — 76816 OB US FOLLOW-UP PER FETUS: CPT

## 2022-12-20 PROCEDURE — 99215 OFFICE O/P EST HI 40 MIN: CPT | Mod: 25 | Performed by: STUDENT IN AN ORGANIZED HEALTH CARE EDUCATION/TRAINING PROGRAM

## 2022-12-20 PROCEDURE — 76820 UMBILICAL ARTERY ECHO: CPT | Mod: 26 | Performed by: STUDENT IN AN ORGANIZED HEALTH CARE EDUCATION/TRAINING PROGRAM

## 2022-12-20 PROCEDURE — G0463 HOSPITAL OUTPT CLINIC VISIT: HCPCS | Mod: 25 | Performed by: STUDENT IN AN ORGANIZED HEALTH CARE EDUCATION/TRAINING PROGRAM

## 2022-12-20 PROCEDURE — 999N000069 HC STATISTIC GENETIC COUNSELING, < 16 MIN: Performed by: GENETIC COUNSELOR, MS

## 2022-12-20 NOTE — PROGRESS NOTES
Please see the full imaging report from the [employer] program under the imaging tab.    Elva Mcgee MD  Maternal Fetal Medicine

## 2022-12-20 NOTE — PROGRESS NOTES
[facility]  Genetic Counseling Consult    Patient:  Aubree Monsivais YOB: 1990   Date of Service:  22   MRN: [id number]    Aubree was seen at the [facility] for genetic consultation. The indication for genetic counseling is abnormal fetal ultrasound. The patient was accompanied to this visit by their partner. The patient is wearing a mask due to current [facility] policies.     The session was conducted in English.      IMPRESSION/ PLAN   1. Aubree has been seen previously in pregnancy by [facility] genetic counseling, please see corresponding documentation for details. Briefly, Aubree's pregnancy is known to be complicated by growth restriction and a congenital heart defect.  Previous genetic screening includes negative [facility] NIPT. Aubree and her partner met with genetic counseling today to review these results and options for further testing via amniocentesis.    2. Aubree had a follow up comprehensive anatomy scan today, which demonstrated findings consistent with previous [facility] ultrasound, please see corresponding documentation for details.     3. Aubree was uncertain about proceed with amniocentesis and understands this remains available.  She will return to [facility] in ~1 week for reassessment.     PREGNANCY HISTORY   /Parity:        CURRENT PREGNANCY   Current Age: 32 year old   Age at Delivery: 33 year old  SUN: 5/3/2023, by Last Menstrual Period                                   Gestational Age: 20w6d  This pregnancy is a single gestation.   This pregnancy was conceived spontaneously.    MEDICAL HISTORY   Aubree s reported medical history is not expected to impact pregnancy management or risks to fetal development.       Discussion     Aubree and her partner met with genetic counseling today to discuss her previous NIPT results and options for amniocentesis.  We reviewed that her ultrasound  findings are typically highly concerning for chromosome abnormalities such as trisomy 18, and that specifically the abnormal placental appearance would raise concern for triploidy. Aubree's previous NIPT results are negative/normal for these conditions, and we discussed that her results put the pregnancy at significantly reduced risk for these conditions. Aubree and her partner understand that her NIPT results do not rule out these conditions completely, but typically the residual risk after NIPT is >1/10,000.  We discussed that further genetic testing is available in the form of an amniocentesis. Aubree understands that an amniocentesis allows for a more broad genetic assessment, and we discussed the details of chromosome analysis and chromosome microarray testing in particular. Aubree was uncertain about pursuing an amniocentesis because of the associated risks for the procedure.  We discussed that trying to find 'why' something happened in a pregnancy is often an extremely important process for families experiencing a pregnancy crisis, but that genetic testing may not provide any answers at all, and in some circumstances can create more uncertainty.  Aubree also understands that finding a genetic diagnosis may not provide immediate changes to prenatal care.  Aubree understands this care remains available at any time if desired moving forward, and will return to [facility] in ~1 week for a follow up scan.         GENETIC TESTING OPTIONS   Genetic testing during a pregnancy includes screening and diagnostic procedures.      Screening tests are non-invasive which means no risk to the pregnancy and includes ultrasounds and blood work. The benefits and limitations of screening were reviewed. Screening tests provide a risk assessment (chance) specific to the pregnancy for certain fetal chromosome abnormalities but cannot definitively diagnose or exclude a fetal chromosome abnormality. Follow-up genetic counseling and  consideration of diagnostic testing is recommended with any abnormal screening result. Diagnostic testing during a pregnancy is more certain and can test for more conditions. However, the tests do have a risk of miscarriage that requires careful consideration. These tests can detect fetal chromosome abnormalities with greater than 99% certainty. These tests can detect fetal chromosome abnormalities with greater than 99% certainty. Results can be compromised by maternal cell contamination or mosaicism and are limited by the resolution of current genetic testing technology.     There is no screening or diagnostic test that detects all forms of birth defects or intellectual disability.     We discussed the following screening options:   Non-invasive prenatal testing (NIPT)    Also called cell-free DNA screening because it detects chromosomes from the placenta in the pregnant person's blood    Can be done any time after 10 weeks gestation    Screens for trisomy 21, trisomy 18, trisomy 13, and sex chromosome aneuploidies    Cannot screen for open neural tube defects, maternal serum AFP after 15 weeks is recommended    We discussed the following diagnostic options:   Amniocentesis    Invasive diagnostic procedure done after 15 weeks gestation    The procedure collects a small sample of amniotic fluid for the purpose of chromosomal testing and/or other genetic testing    Diagnostic result; more than 99% sensitivity for fetal chromosome abnormalities    Testing for AFP in the amniotic fluid can test for open neural tube defects        It was a pleasure to be involved with [facility] care. Face-to-face time of the meeting was 15 minutes.    Broderick Rodriguez, GC, MS, C  Certified and [place] Licensed Genetic Counselor  [facility]  Maternal Fetal Medicine  Office: [phone]  [facility]: [phone]   Fax: [phone]  [facility] [place]

## 2022-12-28 ENCOUNTER — HOSPITAL ENCOUNTER (OUTPATIENT)
Dept: ULTRASOUND IMAGING | Facility: CLINIC | Age: 32
Discharge: HOME | End: 2022-12-28
Attending: STUDENT IN AN ORGANIZED HEALTH CARE EDUCATION/TRAINING PROGRAM
Payer: MEDICAID

## 2022-12-28 ENCOUNTER — OFFICE VISIT (OUTPATIENT)
Dept: MATERNAL FETAL MEDICINE | Facility: CLINIC | Age: 32
End: 2022-12-28
Attending: STUDENT IN AN ORGANIZED HEALTH CARE EDUCATION/TRAINING PROGRAM
Payer: MEDICAID

## 2022-12-28 VITALS — HEART RATE: 69 BPM | SYSTOLIC BLOOD PRESSURE: 116 MMHG | DIASTOLIC BLOOD PRESSURE: 72 MMHG

## 2022-12-28 DIAGNOSIS — O35.9XX0 FETAL ABNORMALITY AFFECTING MANAGEMENT OF MOTHER, SINGLE OR UNSPECIFIED FETUS: ICD-10-CM

## 2022-12-28 DIAGNOSIS — O35.9XX0 PREGNANCY AFFECTED BY MULTIPLE CONGENITAL ANOMALIES OF FETUS, SINGLE OR UNSPECIFIED FETUS: ICD-10-CM

## 2022-12-28 DIAGNOSIS — O36.5920 POOR FETAL GROWTH AFFECTING MANAGEMENT OF MOTHER IN SECOND TRIMESTER, SINGLE OR UNSPECIFIED FETUS: Primary | ICD-10-CM

## 2022-12-28 DIAGNOSIS — O36.5990 FETAL GROWTH RESTRICTION ANTEPARTUM: ICD-10-CM

## 2022-12-28 PROCEDURE — 76820 UMBILICAL ARTERY ECHO: CPT | Mod: 26 | Performed by: OBSTETRICS & GYNECOLOGY

## 2022-12-28 PROCEDURE — 76815 OB US LIMITED FETUS(S): CPT | Mod: 26 | Performed by: OBSTETRICS & GYNECOLOGY

## 2022-12-28 PROCEDURE — 76820 UMBILICAL ARTERY ECHO: CPT

## 2022-12-28 NOTE — PROGRESS NOTES
"Please see \"Imaging\" tab under \"Chart Review\" for details of today's US at the [facility].    Greyson Ramírez MD  Maternal-Fetal Medicine      "

## 2023-01-05 ENCOUNTER — HOSPITAL ENCOUNTER (OUTPATIENT)
Dept: ULTRASOUND IMAGING | Facility: CLINIC | Age: 33
Discharge: HOME | End: 2023-01-05
Attending: OBSTETRICS & GYNECOLOGY
Payer: MEDICAID

## 2023-01-05 ENCOUNTER — ANCILLARY ORDERS (OUTPATIENT)
Dept: MATERNAL FETAL MEDICINE | Facility: CLINIC | Age: 33
End: 2023-01-05

## 2023-01-05 ENCOUNTER — HOSPITAL ENCOUNTER (OUTPATIENT)
Facility: CLINIC | Age: 33
End: 2023-01-05
Attending: OBSTETRICS & GYNECOLOGY | Admitting: OBSTETRICS & GYNECOLOGY
Payer: MEDICAID

## 2023-01-05 ENCOUNTER — OFFICE VISIT (OUTPATIENT)
Dept: MATERNAL FETAL MEDICINE | Facility: CLINIC | Age: 33
End: 2023-01-05
Attending: OBSTETRICS & GYNECOLOGY
Payer: MEDICAID

## 2023-01-05 DIAGNOSIS — O35.9XX0 FETAL ABNORMALITY AFFECTING MANAGEMENT OF MOTHER, SINGLE OR UNSPECIFIED FETUS: ICD-10-CM

## 2023-01-05 DIAGNOSIS — O36.5920 POOR FETAL GROWTH AFFECTING MANAGEMENT OF MOTHER IN SECOND TRIMESTER, SINGLE OR UNSPECIFIED FETUS: Primary | ICD-10-CM

## 2023-01-05 PROCEDURE — 76816 OB US FOLLOW-UP PER FETUS: CPT

## 2023-01-05 PROCEDURE — 76816 OB US FOLLOW-UP PER FETUS: CPT | Mod: 26 | Performed by: STUDENT IN AN ORGANIZED HEALTH CARE EDUCATION/TRAINING PROGRAM

## 2023-01-05 PROCEDURE — G0463 HOSPITAL OUTPT CLINIC VISIT: HCPCS | Mod: 25 | Performed by: STUDENT IN AN ORGANIZED HEALTH CARE EDUCATION/TRAINING PROGRAM

## 2023-01-05 PROCEDURE — 99214 OFFICE O/P EST MOD 30 MIN: CPT | Mod: 25 | Performed by: STUDENT IN AN ORGANIZED HEALTH CARE EDUCATION/TRAINING PROGRAM

## 2023-01-05 PROCEDURE — 76820 UMBILICAL ARTERY ECHO: CPT | Mod: 26 | Performed by: STUDENT IN AN ORGANIZED HEALTH CARE EDUCATION/TRAINING PROGRAM

## 2023-01-06 ENCOUNTER — DOCUMENTATION ONLY (OUTPATIENT)
Dept: MATERNAL FETAL MEDICINE | Facility: CLINIC | Age: 33
End: 2023-01-06

## 2023-01-06 ENCOUNTER — PREP FOR PROCEDURE (OUTPATIENT)
Dept: OBGYN | Facility: CLINIC | Age: 33
End: 2023-01-06

## 2023-01-06 ENCOUNTER — TELEPHONE (OUTPATIENT)
Dept: MATERNAL FETAL MEDICINE | Facility: CLINIC | Age: 33
End: 2023-01-06

## 2023-01-06 ENCOUNTER — TELEPHONE (OUTPATIENT)
Dept: OBGYN | Facility: CLINIC | Age: 33
End: 2023-01-06

## 2023-01-06 DIAGNOSIS — O36.5990 FETAL GROWTH RESTRICTION ANTEPARTUM: Primary | ICD-10-CM

## 2023-01-06 RX ORDER — ACETAMINOPHEN 325 MG/1
975 TABLET ORAL ONCE
Status: CANCELLED | OUTPATIENT
Start: 2023-01-06 | End: 2023-01-06

## 2023-01-06 NOTE — PROGRESS NOTES
Aubree Monsivais is a 32 year old  at 23w2d    Ob History: 2   Referral provider: [facility]  Fetal Diagnosis: sever FGR    Surgery planning:  -- Mife: yes  -- Miso: 400 mcg buccal 3 hours prior to procedure  -- Osmotic dilators: yes    -- OR time: 60 minutes  -- Preop Exam: day of surgery  -- PAC appt: no  -- Ok for CSC: no    Pre-op orders placed.  MD Ramez

## 2023-01-06 NOTE — TELEPHONE ENCOUNTER
Referral received from [facility] for D&E.  She is being referred [facility] because of fetal cardiac anomalies and severe fetal growth restriction  Gestational age 23w2d  SUN 5/3/23  Medical records have been received    [facility] has verified  insurance  Medical necessity form is required, and has been started.  When procedure is scheduled, will need to be priority scanned.

## 2023-01-06 NOTE — TELEPHONE ENCOUNTER
1/6/2023    Called Aubree to discuss coordinate of care for termination of pregnancy.  Our financial services team was able to confirm her active medicaid status, so that is not a barrier to care.  (member ID [id number]).  We discussed that based on available scheduling within the time limit for care, options for care at other providers may be necessary.  Specifically, [facility].  Aubree is agreeable to [facility] and genetic counseling will facilitate ASAP.  [facility] social work will contact Aubree directly to facilitate care.  I offered Aubree my condolences for the complications affecting her pregnancy and Aubree understands genetic counseling remains available as needed moving forward.     Broderick Rodriguez MS, [facility]  Licensed Genetic Counselor  Phone: [phone]  Pager: [phone]

## 2023-01-06 NOTE — TELEPHONE ENCOUNTER
Patient added to active D&E list? yes  Dating US in records? yes  Insurance check complete? yes  If [place], medical [place] completed and priority scanned to patient chart?NA  Checklist given to Dr Hutton for case request on 1/6/23  To  for scheduling on 1/6/23    Call from Miriam at [facility].  [facility] at [facility] on 1/[facility]/23.

## 2023-01-06 NOTE — PROGRESS NOTES
"Per [facility] financial securing:    \"[place] and meets medical [place].  Please make sure that Medical Necessity form has been completed before procedures.\"     TOP Checklist completed and Broderick Rodriguez [facility], working on coordination with community resources.  He will call patient once final plan is set.    No [facility] followup appts scheduled at this time.    Miriam Arellano RN                "

## 2023-01-07 ENCOUNTER — HEALTH MAINTENANCE LETTER (OUTPATIENT)
Age: 33
End: 2023-01-07

## 2023-06-28 ENCOUNTER — OFFICE VISIT (OUTPATIENT)
Dept: URGENT CARE | Facility: URGENT CARE | Age: 33
End: 2023-06-28
Payer: COMMERCIAL

## 2023-06-28 VITALS
HEART RATE: 88 BPM | BODY MASS INDEX: 29.43 KG/M2 | WEIGHT: 194.2 LBS | DIASTOLIC BLOOD PRESSURE: 79 MMHG | OXYGEN SATURATION: 99 % | RESPIRATION RATE: 16 BRPM | SYSTOLIC BLOOD PRESSURE: 129 MMHG | TEMPERATURE: 98.9 F | HEIGHT: 68 IN

## 2023-06-28 DIAGNOSIS — Z11.3 SCREEN FOR STD (SEXUALLY TRANSMITTED DISEASE): ICD-10-CM

## 2023-06-28 DIAGNOSIS — R39.9 URINARY TRACT INFECTION SYMPTOMS: Primary | ICD-10-CM

## 2023-06-28 DIAGNOSIS — B37.31 CANDIDIASIS OF VAGINA: ICD-10-CM

## 2023-06-28 DIAGNOSIS — F43.21 GRIEF REACTION: ICD-10-CM

## 2023-06-28 DIAGNOSIS — R10.9 FLANK PAIN: ICD-10-CM

## 2023-06-28 DIAGNOSIS — R30.0 DYSURIA: ICD-10-CM

## 2023-06-28 DIAGNOSIS — N89.8 VAGINAL ODOR: ICD-10-CM

## 2023-06-28 LAB
ALBUMIN UR-MCNC: NEGATIVE MG/DL
APPEARANCE UR: CLEAR
BILIRUB UR QL STRIP: NEGATIVE
CLUE CELLS: ABNORMAL
COLOR UR AUTO: YELLOW
GLUCOSE UR STRIP-MCNC: NEGATIVE MG/DL
HGB UR QL STRIP: NEGATIVE
KETONES UR STRIP-MCNC: NEGATIVE MG/DL
LEUKOCYTE ESTERASE UR QL STRIP: NEGATIVE
NITRATE UR QL: NEGATIVE
PH UR STRIP: 6.5 [PH] (ref 5–7)
SP GR UR STRIP: 1.01 (ref 1–1.03)
TRICHOMONAS, WET PREP: ABNORMAL
UROBILINOGEN UR STRIP-ACNC: 0.2 E.U./DL
WBC'S/HIGH POWER FIELD, WET PREP: ABNORMAL
YEAST, WET PREP: PRESENT

## 2023-06-28 PROCEDURE — 81003 URINALYSIS AUTO W/O SCOPE: CPT | Performed by: FAMILY MEDICINE

## 2023-06-28 PROCEDURE — 87389 HIV-1 AG W/HIV-1&-2 AB AG IA: CPT | Performed by: FAMILY MEDICINE

## 2023-06-28 PROCEDURE — 86780 TREPONEMA PALLIDUM: CPT | Performed by: FAMILY MEDICINE

## 2023-06-28 PROCEDURE — 99203 OFFICE O/P NEW LOW 30 MIN: CPT | Performed by: FAMILY MEDICINE

## 2023-06-28 PROCEDURE — 86803 HEPATITIS C AB TEST: CPT | Performed by: FAMILY MEDICINE

## 2023-06-28 PROCEDURE — 87210 SMEAR WET MOUNT SALINE/INK: CPT | Performed by: FAMILY MEDICINE

## 2023-06-28 PROCEDURE — 87491 CHLMYD TRACH DNA AMP PROBE: CPT | Performed by: FAMILY MEDICINE

## 2023-06-28 PROCEDURE — 87086 URINE CULTURE/COLONY COUNT: CPT | Performed by: FAMILY MEDICINE

## 2023-06-28 PROCEDURE — 87591 N.GONORRHOEAE DNA AMP PROB: CPT | Performed by: FAMILY MEDICINE

## 2023-06-28 PROCEDURE — 36415 COLL VENOUS BLD VENIPUNCTURE: CPT | Performed by: FAMILY MEDICINE

## 2023-06-28 RX ORDER — FLUCONAZOLE 150 MG/1
150 TABLET ORAL ONCE
Qty: 1 TABLET | Refills: 0 | Status: SHIPPED | OUTPATIENT
Start: 2023-06-28 | End: 2023-06-28

## 2023-06-28 RX ORDER — CEPHALEXIN 500 MG/1
500 CAPSULE ORAL 2 TIMES DAILY
Qty: 20 CAPSULE | Refills: 0 | Status: SHIPPED | OUTPATIENT
Start: 2023-06-28 | End: 2023-07-08

## 2023-06-28 ASSESSMENT — PAIN SCALES - GENERAL: PAINLEVEL: NO PAIN (0)

## 2023-06-28 NOTE — PROGRESS NOTES
ASSESSMENT/PLAN:      ICD-10-CM    1. Urinary tract infection symptoms  R39.9 UA Macroscopic with reflex to Microscopic and Culture     UA Macroscopic with reflex to Microscopic and Culture     Urine Culture Aerobic Bacterial - lab collect     cephALEXin (KEFLEX) 500 MG capsule     Urine Culture Aerobic Bacterial - lab collect      2. Flank pain  R10.9 UA Macroscopic with reflex to Microscopic and Culture     Urine Culture Aerobic Bacterial - lab collect    left CVAT, no right CVAT       3. Dysuria  R30.0 UA Macroscopic with reflex to Microscopic and Culture     UA Macroscopic with reflex to Microscopic and Culture     Urine Culture Aerobic Bacterial - lab collect      4. Candidiasis of vagina  B37.31 Wet prep - lab collect     Wet prep - lab collect     fluconazole (DIFLUCAN) 150 MG tablet      5. Vaginal odor  N89.8 NEISSERIA GONORRHOEA PCR     CHLAMYDIA TRACHOMATIS PCR     Wet prep - lab collect     Wet prep - lab collect      6. Screen for STD (sexually transmitted disease)  Z11.3 Treponema Abs w Reflex to RPR and Titer     Hepatitis C Screen Reflex to HCV RNA Quant and Genotype     HIV Antigen Antibody Combo     Treponema Abs w Reflex to RPR and Titer     Hepatitis C Screen Reflex to HCV RNA Quant and Genotype     HIV Antigen Antibody Combo      7. Grief reaction  F43.21     fetal demise at 23 weeks 1/2023,due to severe anomalies, tearful today at appt           Patient Instructions     Start diflucan one dose now for yeast infection    Start cephalexin 2 times a day for 10 days for probable urine infection  No matter if culture is positive or negative, continue antibiotic     If fever, nausea is worse, vomiting, pain in back worse to ER            Reviewed medication instructions and side effects. Follow up if experiences side effects.     I reviewed supportive care, otc meds to use if needed, expected course, and signs of concern.  Follow up as needed or if she does not improve within  1-2 days or if  "worsens in any way.  Reviewed red flag symptoms and is to go to the ER if experiences any of these.     The use of Dragon/Meraki dictation services may have been used to construct the content in this note; any grammatical or spelling errors are non-intentional. Please contact the author of this note directly if you are in need of any clarification.      On the day of the encounter, time spend on chart review, patient visit, review of testing, documentation was 30  minutes              Patient presents with:  Rule out Urinary Tract Infection: Patient reports some urinary frequency beginning three days ago.   Vaginal Problem: Patient reports \"vaginal odor\" that she first noticed one week ago. Patient has history of BV. Patient also requesting STI testing due to unprotected intercourse three weeks ago.        Shaun Monsivais is a 33 year old female who presents to clinic today for the following health issues:    HPI         URINARY TRACT SYMPTOMS      Duration: 10 days ago     Description  frequency, urgency, hesitancy and vaginal discharge    Intensity:  moderate    Accompanying signs and symptoms:  Fever/chills: no   Flank pain YES- more low right sided back pain   Nausea and vomiting: YES- nausea, no vomiting   Vaginal symptoms: discharge  Abdominal/Pelvic Pain: YES- lower abdominal pain     History  History of frequent UTI's: no   History of kidney stones: no   Sexually Active: YES- unprotected intercourse with ex partner  Vaginal discharge after intercourse, odor, took probiotics, but odor did not go away as it has in the past, hx BV after intercourse   Still having odor, no itching, no burning   IUD-IUD placement after her recent fetal demise-23 weeks 1/2023 due to severe anomalies   uncertain name of IUD, has hormone to keep from having menses, feels like body is adjusting to it  Hx of mirena iud in the past   Menses due in another a week   Possibility of pregnancy: No    Precipitating or " "alleviating factors: as noted   Therapies tried and outcome:  Probiotics as noted     Had similar pain in lower back and abdomen with chlamydia infection 2009      Past Medical History:   Diagnosis Date     NO ACTIVE PROBLEMS      Social History     Tobacco Use     Smoking status: Former     Passive exposure: Past     Smokeless tobacco: Never     Tobacco comments:     passive smoker history   Substance Use Topics     Alcohol use: No       Current Outpatient Medications   Medication Sig Dispense Refill     fluocinonide (LIDEX) 0.05 % external ointment Apply to affected areas on the legs and arm twice a day up to two weeks for dermatitis. Indications: Skin Inflammation       Prenatal Vit-Fe Fumarate-FA (PRENATAL MULTIVITAMIN  PLUS IRON) 27-0.8 MG TABS Take 1 tablet by mouth daily       No Known Allergies          ROS are negative, except as otherwise noted HPI      Objective    /79 (BP Location: Left arm, Patient Position: Sitting, Cuff Size: Adult Regular)   Pulse 88   Temp 98.9  F (37.2  C) (Tympanic)   Resp 16   Ht 1.727 m (5' 8\")   Wt 88.1 kg (194 lb 3.2 oz)   LMP 07/27/2022   SpO2 99%   Breastfeeding No   BMI 29.53 kg/m    Body mass index is 29.53 kg/m .  Physical Exam   GENERAL: fatigued, tearful -recent fetal loss at 23 weeks due to severe anomalies -1/2023   ABDOMEN: soft, mild suprapubic tenderness,  bowel sounds normal, positive left CVAT, negative  right CVAT  NEURO: Normal strength and tone, mentation intact and speech normal      Diagnostic Test Results:  Labs reviewed in Epic  Results for orders placed or performed in visit on 06/28/23   UA Macroscopic with reflex to Microscopic and Culture     Status: Normal    Specimen: Urine, Midstream   Result Value Ref Range    Color Urine Yellow Colorless, Straw, Light Yellow, Yellow    Appearance Urine Clear Clear    Glucose Urine Negative Negative mg/dL    Bilirubin Urine Negative Negative    Ketones Urine Negative Negative mg/dL    Specific " Gravity Urine 1.015 1.003 - 1.035    Blood Urine Negative Negative    pH Urine 6.5 5.0 - 7.0    Protein Albumin Urine Negative Negative mg/dL    Urobilinogen Urine 0.2 0.2, 1.0 E.U./dL    Nitrite Urine Negative Negative    Leukocyte Esterase Urine Negative Negative    Narrative    Microscopic not indicated   Result Value Ref Range    Trichomonas Absent Absent    Yeast Present (A) Absent    Clue Cells Absent Absent    WBCs/high power field 2+ (A) None

## 2023-06-28 NOTE — PATIENT INSTRUCTIONS
Start diflucan one dose now for yeast infection    Start cephalexin 2 times a day for 10 days for probable urine infection  No matter if culture is positive or negative, continue antibiotic     If fever, nausea is worse, vomiting, pain in back worse to ER

## 2023-06-29 LAB
C TRACH DNA SPEC QL NAA+PROBE: NEGATIVE
HCV AB SERPL QL IA: NONREACTIVE
N GONORRHOEA DNA SPEC QL NAA+PROBE: NEGATIVE
T PALLIDUM AB SER QL: NONREACTIVE

## 2023-06-30 LAB
BACTERIA UR CULT: NO GROWTH
HIV 1+2 AB+HIV1 P24 AG SERPL QL IA: NONREACTIVE

## 2024-02-10 ENCOUNTER — HEALTH MAINTENANCE LETTER (OUTPATIENT)
Age: 34
End: 2024-02-10

## 2024-02-12 ENCOUNTER — OFFICE VISIT (OUTPATIENT)
Dept: URGENT CARE | Facility: URGENT CARE | Age: 34
End: 2024-02-12
Payer: COMMERCIAL

## 2024-02-12 VITALS
WEIGHT: 209 LBS | BODY MASS INDEX: 31.78 KG/M2 | HEART RATE: 87 BPM | TEMPERATURE: 99 F | DIASTOLIC BLOOD PRESSURE: 72 MMHG | OXYGEN SATURATION: 100 % | SYSTOLIC BLOOD PRESSURE: 114 MMHG | RESPIRATION RATE: 18 BRPM

## 2024-02-12 DIAGNOSIS — N76.0 BV (BACTERIAL VAGINOSIS): Primary | ICD-10-CM

## 2024-02-12 DIAGNOSIS — Z11.3 ROUTINE SCREENING FOR STI (SEXUALLY TRANSMITTED INFECTION): ICD-10-CM

## 2024-02-12 DIAGNOSIS — N89.8 VAGINAL ODOR: ICD-10-CM

## 2024-02-12 DIAGNOSIS — B96.89 BV (BACTERIAL VAGINOSIS): Primary | ICD-10-CM

## 2024-02-12 LAB
ALBUMIN UR-MCNC: NEGATIVE MG/DL
APPEARANCE UR: CLEAR
BACTERIA #/AREA URNS HPF: ABNORMAL /HPF
BILIRUB UR QL STRIP: NEGATIVE
CLUE CELLS: PRESENT
COLOR UR AUTO: YELLOW
GLUCOSE UR STRIP-MCNC: NEGATIVE MG/DL
HGB UR QL STRIP: NEGATIVE
KETONES UR STRIP-MCNC: NEGATIVE MG/DL
LEUKOCYTE ESTERASE UR QL STRIP: NEGATIVE
NITRATE UR QL: NEGATIVE
PH UR STRIP: 7 [PH] (ref 5–7)
RBC #/AREA URNS AUTO: ABNORMAL /HPF
SP GR UR STRIP: 1.02 (ref 1–1.03)
SQUAMOUS #/AREA URNS AUTO: ABNORMAL /LPF
TRANS CELLS #/AREA URNS HPF: ABNORMAL /HPF
TRICHOMONAS, WET PREP: ABNORMAL
UROBILINOGEN UR STRIP-ACNC: 0.2 E.U./DL
WBC #/AREA URNS AUTO: ABNORMAL /HPF
WBC'S/HIGH POWER FIELD, WET PREP: ABNORMAL
YEAST, WET PREP: ABNORMAL

## 2024-02-12 PROCEDURE — 87591 N.GONORRHOEAE DNA AMP PROB: CPT | Performed by: PHYSICIAN ASSISTANT

## 2024-02-12 PROCEDURE — 99214 OFFICE O/P EST MOD 30 MIN: CPT | Performed by: PHYSICIAN ASSISTANT

## 2024-02-12 PROCEDURE — 87210 SMEAR WET MOUNT SALINE/INK: CPT | Performed by: PHYSICIAN ASSISTANT

## 2024-02-12 PROCEDURE — 81001 URINALYSIS AUTO W/SCOPE: CPT | Performed by: PHYSICIAN ASSISTANT

## 2024-02-12 PROCEDURE — 87491 CHLMYD TRACH DNA AMP PROBE: CPT | Performed by: PHYSICIAN ASSISTANT

## 2024-02-12 RX ORDER — METRONIDAZOLE 500 MG/1
500 TABLET ORAL 2 TIMES DAILY
Qty: 14 TABLET | Refills: 0 | Status: SHIPPED | OUTPATIENT
Start: 2024-02-12 | End: 2024-02-19

## 2024-02-12 ASSESSMENT — PAIN SCALES - GENERAL: PAINLEVEL: NO PAIN (0)

## 2024-02-12 NOTE — PROGRESS NOTES
Assessment & Plan     BV (bacterial vaginosis)  - metroNIDAZOLE (FLAGYL) 500 MG tablet; Take 1 tablet (500 mg) by mouth 2 times daily for 7 days    Routine screening for STI (sexually transmitted infection)  - Wet prep - lab collect; Future  - Chlamydia & Gonorrhea by PCR, [facility] - Clinic Collect    Vaginal odor  - UA with Microscopic reflex to Culture - lab collect; Future  - Wet prep - lab collect; Future  - UA with Microscopic reflex to Culture - lab collect  - Wet prep - lab collect  - UA Microscopic with Reflex to Culture  - Chlamydia & Gonorrhea by PCR, [facility] - Clinic Collect    UA normal, wet prep positive for clue cells.  Will treat with metronidazole.  We discussed no drinking alcohol while taking this medication.  Gonorrhea and chlamydia in process.  Follow-up to be seen if symptoms significantly worsen or fail to improve.    Return in about 1 week (around 2/19/2024) for visit with primary care provider if not improving.     Dalila Castillo PA-C  [facility]    Shaun Monsivais is a 33 year old who presents for the following health issues     Patient presents with:  UTI  STD: TESTED for everything       [facility]    Aubree presents clinic today for evaluation of vaginal odor.  Symptoms first began about 3 days ago.  She does have a slight cloudy white discharge.  No pain with urination.  She states that she recently had intercourse with a male partner and would like STI testing as well.      Review of Systems   ROS negative except as stated above.      Objective    /72 (BP Location: Left arm, Patient Position: Chair, Cuff Size: Adult Regular)   Pulse 87   Temp 99  F (37.2  C)   Resp 18   Wt 94.8 kg (209 lb)   LMP 02/05/2024 (Approximate)   SpO2 100%   BMI 31.78 kg/m    Physical Exam   GENERAL: alert and no distress  RESP: lungs clear to auscultation - no rales, rhonchi or wheezes  CV: regular rate and rhythm, normal S1 S2, no S3 or S4, no murmur,  click or rub, no peripheral edema  ABDOMEN: soft, nontender, no hepatosplenomegaly, no masses and bowel sounds normal  BACK: No CVA tenderness to percussion      Results for orders placed or performed in visit on 02/12/24   UA with Microscopic reflex to Culture - lab collect     Status: Normal    Specimen: Urine, Clean Catch   Result Value Ref Range    Color Urine Yellow Colorless, Straw, Light Yellow, Yellow    Appearance Urine Clear Clear    Glucose Urine Negative Negative mg/dL    Bilirubin Urine Negative Negative    Ketones Urine Negative Negative mg/dL    Specific Gravity Urine 1.025 1.003 - 1.035    Blood Urine Negative Negative    pH Urine 7.0 5.0 - 7.0    Protein Albumin Urine Negative Negative mg/dL    Urobilinogen Urine 0.2 0.2, 1.0 E.U./dL    Nitrite Urine Negative Negative    Leukocyte Esterase Urine Negative Negative   UA Microscopic with Reflex to Culture     Status: Abnormal   Result Value Ref Range    Bacteria Urine Moderate (A) None Seen /HPF    RBC Urine 0-2 0-2 /HPF /HPF    WBC Urine 0-5 0-5 /HPF /HPF    Squamous Epithelials Urine Few (A) None Seen /LPF    Transitional Epithelials Urine Few (A) None Seen /HPF    Narrative    Urine Culture not indicated   Wet prep - lab collect     Status: Abnormal    Specimen: Vagina; Swab   Result Value Ref Range    Trichomonas Absent Absent    Yeast Absent Absent    Clue Cells Present (A) Absent    WBCs/high power field 1+ (A) None

## 2024-02-13 LAB
C TRACH DNA SPEC QL PROBE+SIG AMP: NEGATIVE
N GONORRHOEA DNA SPEC QL NAA+PROBE: NEGATIVE

## 2024-08-22 ENCOUNTER — TRANSCRIBE ORDERS (OUTPATIENT)
Dept: OTHER | Age: 34
End: 2024-08-22

## 2024-08-22 DIAGNOSIS — S76.311A RUPTURE OF RIGHT PROXIMAL HAMSTRING TENDON, INITIAL ENCOUNTER: Primary | ICD-10-CM

## 2024-09-16 ENCOUNTER — THERAPY VISIT (OUTPATIENT)
Dept: PHYSICAL THERAPY | Facility: CLINIC | Age: 34
End: 2024-09-16
Payer: COMMERCIAL

## 2024-09-16 DIAGNOSIS — S76.311A: Primary | ICD-10-CM

## 2024-09-16 PROCEDURE — 97161 PT EVAL LOW COMPLEX 20 MIN: CPT | Mod: GP

## 2024-09-16 PROCEDURE — 97110 THERAPEUTIC EXERCISES: CPT | Mod: GP

## 2024-09-16 NOTE — PROGRESS NOTES
PHYSICAL THERAPY EVALUATION  Type of Visit: Evaluation        Fall Risk Screen:  Fall screen completed by: PT  Have you fallen 2 or more times in the past year?: No  Have you fallen and had an injury in the past year?: No  Is patient a fall risk?: No    Subjective  Pt is a 34 year old female arriving for eval of right posterior thigh hamstring pain. Mechanism of injury includes  Playing kickball and was sprinting to a base and felt a pull. She notes she was not able to walk or stretch following. Numbness was present for first few days. She works as a  but is not limited by symptoms at this time. Denies issues with stairs or short distance ambulation. She is an avid weight ,  and kickball league and would like to return to daily life without pain as well as exercise routines.          Presenting condition or subjective complaint:    Date of onset: 08/06/24    Relevant medical history:     Dates & types of surgery:      Prior diagnostic imaging/testing results:       Prior therapy history for the same diagnosis, illness or injury:        Prior Level of Function  Transfers: Independent  Ambulation: Independent  ADL: Independent    Living Environment  Social support:     Type of home:     Stairs to enter the home:         Ramp:     Stairs inside the home:         Help at home:    Equipment owned:       Employment:      Hobbies/Interests:      Patient goals for therapy:      Pain assessment: Location: proximal hamstring /Rating: 3/10 at worst, 0/10 at rest     Objective   Hip eval:    Observation: minimal TKE with gait       Passive Range of motion - *indicates pain       Involved (R)   Uninvolved (L)  Flexion  WNL WNL   Extension WNL WNL   Abduction WNL WNL   Adduction WNL WNL   IR WNL WNL   ER WNL WNL   Knee flexion 100 deg 110 deg   Knee extension 3 deg 0 deg       Neurological exam - WNL    Reflexes:  Patellar: WNL  Achilles: WNL  Heel walking: WNL  Toe walking:  WNL      Strength:    Involved (R)   Uninvolved (L)  Flexion 5 5   Extension 4 5   Abduction 4 5   Adduction 4 5   IR 5 5   ER 5 5   Knee flexion 4- *fearful 5   Knee extension 5 5         Special tests -   MANN:  neg  Eamon test: neg  SLR: 20% limited on RLE  [place]/SIJ:  neg  Prone Knee Bend:  20% limited on RLE       Lumbar screen:   Flexibility Lumbar flexion: finger tips to ground; pt reports before injury being able to do palms to ground    Palpation: TTP proximal hamstring     Functional strength -  Squat: WNL  SL squat: knee valgus bilaterally  Step down:  WNL  Stairs x 2 flights: Gait WNL bilaterally      Assessment & Plan   CLINICAL IMPRESSIONS  Medical Diagnosis: Right hamstring proximal tendon rupture    Treatment Diagnosis: Right hamstring proximal tendon rupture   Impression/Assessment: Patient is a 34 year old who presents to physical therapy with complaints of right posterior thigh pain. The patients symptoms and examination findings correlates with a diagnosis of right proximal hamstring rupture. The patient presents with impaired strength, ROM, mobility and gait which inhibits their ability to participate in recreational exercise and activities, retrieve objects from floor, and ambulate long distances. Pt was educated of role and expected progression of PT, relevant anatomy, rationale for intervention, PT Px and Dx. The patient tolerated the session well with HEP creation and assessment.  The patient will continue to benefit from skilled PT for improved flexibility, strength and return to PLOF.       Clinical Decision Making (Complexity):  Clinical Presentation: Stable/Uncomplicated  Clinical Presentation Rationale: based on medical and personal factors listed in PT evaluation  Clinical Decision Making (Complexity): Low complexity    PLAN OF CARE  Treatment Interventions:  Modalities: Cryotherapy, E-stim  Interventions: Gait Training, Manual Therapy, Neuromuscular Re-education,  Therapeutic Activity, Therapeutic Exercise, Self-Care/Home Management    Long Term Goals     PT Goal 1  Goal Identifier: Strength  Goal Description: Pt will achieve 5/5 MMT strength and equal strength on affected LE to unaffected LE  Rationale: to maximize safety and independence with performance of ADLs and functional tasks;to maximize safety and independence within the home;to maximize safety and independence within the community;to maximize safety and independence with self cares  Goal Progress: in progress  Target Date: 12/09/24  PT Goal 2  Goal Identifier: ROM  Goal Description: Pt will achieve full affected LE ROM  Rationale: to maximize safety and independence with performance of ADLs and functional tasks;to maximize safety and independence within the home;to maximize safety and independence with transportation;to maximize safety and independence with self cares  Goal Progress: in progress  Target Date: 12/09/24  PT Goal 3  Goal Identifier: Sprints/Gym Routine  Goal Description: Pt will return to normal 3x/week gym routine consisting of weight lifting, cardio and sprinting  Rationale: to maximize safety and independence with performance of ADLs and functional tasks;to maximize safety and independence with self cares  Goal Progress: in progress  Target Date: 12/09/24      Frequency of Treatment: 1x / week  Duration of Treatment: 12 weeks    Recommended Referrals to Other Professionals: NA  Education Assessment:   Learner/Method: Patient    Risks and benefits of evaluation/treatment have been explained.   Patient/Family/caregiver agrees with Plan of Care.     Evaluation Time:     PT Eval, Low Complexity Minutes (95491): 15     Signing Clinician: Kenyetta Tate PT        [facility]                                                                                   OUTPATIENT PHYSICAL THERAPY      PLAN OF TREATMENT FOR OUTPATIENT REHABILITATION   Patient's Last Name, First Name,  Aubree Obregon    YOB: 1990   Provider's Name   [facility]   Medical Record No.  [id number]     Onset Date: 08/06/24  Start of Care Date: 09/16/24     Medical Diagnosis:  Right hamstring proximal tendon rupture      PT Treatment Diagnosis:  Right hamstring proximal tendon rupture Plan of Treatment  Frequency/Duration: 1x / week/ 12 weeks    Certification date from 09/16/24 to 12/09/24         See note for plan of treatment details and functional goals     Kenyetta Tate, PT                         I CERTIFY THE NEED FOR THESE SERVICES FURNISHED UNDER        THIS PLAN OF TREATMENT AND WHILE UNDER MY CARE .             Physician Signature               Date    X_____________________________________________________                  Referring Provider:  Fede Kaufman    Initial Assessment  See Epic Evaluation- Start of Care Date: 09/16/24

## 2024-09-23 ENCOUNTER — THERAPY VISIT (OUTPATIENT)
Dept: PHYSICAL THERAPY | Facility: CLINIC | Age: 34
End: 2024-09-23
Payer: COMMERCIAL

## 2024-09-23 DIAGNOSIS — S76.311D RUPTURE OF RIGHT PROXIMAL HAMSTRING TENDON, SUBSEQUENT ENCOUNTER: Primary | ICD-10-CM

## 2024-09-23 PROCEDURE — 97110 THERAPEUTIC EXERCISES: CPT | Mod: GP

## 2025-03-02 ENCOUNTER — HEALTH MAINTENANCE LETTER (OUTPATIENT)
Age: 35
End: 2025-03-02

## 2025-04-14 ENCOUNTER — OFFICE VISIT (OUTPATIENT)
Dept: URGENT CARE | Facility: URGENT CARE | Age: 35
End: 2025-04-14
Payer: COMMERCIAL

## 2025-04-14 VITALS
SYSTOLIC BLOOD PRESSURE: 117 MMHG | HEIGHT: 68 IN | DIASTOLIC BLOOD PRESSURE: 75 MMHG | OXYGEN SATURATION: 100 % | WEIGHT: 213.6 LBS | RESPIRATION RATE: 20 BRPM | BODY MASS INDEX: 32.37 KG/M2 | HEART RATE: 77 BPM | TEMPERATURE: 98.5 F

## 2025-04-14 DIAGNOSIS — N89.8 VAGINAL DISCHARGE: ICD-10-CM

## 2025-04-14 DIAGNOSIS — N89.8 VAGINAL ODOR: ICD-10-CM

## 2025-04-14 DIAGNOSIS — R30.0 DYSURIA: Primary | ICD-10-CM

## 2025-04-14 LAB
ALBUMIN UR-MCNC: NEGATIVE MG/DL
APPEARANCE UR: CLEAR
BACTERIA #/AREA URNS HPF: ABNORMAL /HPF
BILIRUB UR QL STRIP: NEGATIVE
CLUE CELLS: ABNORMAL
COLOR UR AUTO: YELLOW
GLUCOSE UR STRIP-MCNC: NEGATIVE MG/DL
HGB UR QL STRIP: NEGATIVE
KETONES UR STRIP-MCNC: NEGATIVE MG/DL
LEUKOCYTE ESTERASE UR QL STRIP: ABNORMAL
NITRATE UR QL: NEGATIVE
PH UR STRIP: 6 [PH] (ref 5–7)
RBC #/AREA URNS AUTO: ABNORMAL /HPF
SP GR UR STRIP: 1.02 (ref 1–1.03)
SQUAMOUS #/AREA URNS AUTO: ABNORMAL /LPF
TRICHOMONAS, WET PREP: ABNORMAL
UROBILINOGEN UR STRIP-ACNC: 0.2 E.U./DL
WBC #/AREA URNS AUTO: ABNORMAL /HPF
WBC'S/HIGH POWER FIELD, WET PREP: ABNORMAL
YEAST, WET PREP: ABNORMAL

## 2025-04-14 PROCEDURE — 87491 CHLMYD TRACH DNA AMP PROBE: CPT | Performed by: EMERGENCY MEDICINE

## 2025-04-14 PROCEDURE — 81001 URINALYSIS AUTO W/SCOPE: CPT | Performed by: EMERGENCY MEDICINE

## 2025-04-14 PROCEDURE — 99213 OFFICE O/P EST LOW 20 MIN: CPT | Performed by: EMERGENCY MEDICINE

## 2025-04-14 PROCEDURE — 87591 N.GONORRHOEAE DNA AMP PROB: CPT | Performed by: EMERGENCY MEDICINE

## 2025-04-14 PROCEDURE — 87210 SMEAR WET MOUNT SALINE/INK: CPT | Performed by: EMERGENCY MEDICINE

## 2025-04-14 PROCEDURE — 3074F SYST BP LT 130 MM HG: CPT | Performed by: EMERGENCY MEDICINE

## 2025-04-14 PROCEDURE — 3078F DIAST BP <80 MM HG: CPT | Performed by: EMERGENCY MEDICINE

## 2025-04-14 NOTE — PROGRESS NOTES
Urgent Care Clinic Visit    Chief Complaint   Patient presents with    Vaginal Problem     Possible yeast sx itching, odor and discharge no burning onset 2 days                4/14/2025     6:10 PM   Additional Questions   Roomed by april hyde   Accompanied by n/a     Pre-Provider Visit Orders - Vaginal Infection  Reason for visit:  Possible yeast infection

## 2025-04-14 NOTE — PATIENT INSTRUCTIONS
OTC boric acid vaginal suppositories for 5-7 days to see if this is BV related. If whitish vaginal clumpy discharge try OTC monistat. Follow up with gynecology if not improving or return if worse. We will contact you regarding the STD testing if positive.

## 2025-04-14 NOTE — PROGRESS NOTES
Assessment & Plan     Diagnosis:    ICD-10-CM    1. Dysuria  R30.0 UA Macroscopic with reflex to Microscopic and Culture - Lab Collect     UA Macroscopic with reflex to Microscopic and Culture - Lab Collect     UA Microscopic with Reflex to Culture      2. Vaginal odor  N89.8 Wet prep - lab collect     Chlamydia trachomatis/Neisseria gonorrhoeae by PCR - Clinic Collect     Wet prep - lab collect      3. Vaginal discharge  N89.8 Wet prep - lab collect     Chlamydia trachomatis/Neisseria gonorrhoeae by PCR - Clinic Collect     Wet prep - lab collect          Medical Decision Making:  Aubree Monsivais is a 35 year old female who presents for evaluation of vaginal discharge. The physical exam is benign and I doubt a serious underlying cause such as PID, pelvic emergencies, etc. Wet prep is negative. UA negative.. Will test for sexually transmitted diseases given patient preference; she has no particular concerns and discussed we are not doing comprehensive STD screening. Recommended OTC remedies to try and indications to be seen again sooner; we will contact her regarding gonorrhea and chlamydia results. Patient verbalizes understanding and agreement with the plan including reasons to return or follow up with PCP/Gynecology. All questions answered.       Andrea Laboy PA-C  [facility]    Subjective     Aubree Monsivais is a 35 year old female who presents to clinic today for the following health issues:  Chief Complaint   Patient presents with    Vaginal Problem     Possible yeast sx itching, odor and discharge no burning onset 2 days        HPI  Patient states that for the last 2 days she has had slight itching, vaginal discharge and a little odor.  Feels a, be yeast or BV.  She is not overtly concerned for STDs, but is agreeable to gonorrhea/chlamydia testing.  No dysuria, hematuria, frequency or urgency urination.  No concerns for pregnancy.  No abdominal pain, back or flank pain or other  "concerns.    Review of Systems    See HPI    Objective      Vitals: /75 (BP Location: Left arm, Patient Position: Sitting, Cuff Size: Adult Large)   Pulse 77   Temp 98.5  F (36.9  C) (Tympanic)   Resp 20   Ht 1.727 m (5' 8\")   Wt 96.9 kg (213 lb 9.6 oz)   SpO2 100%   BMI 32.48 kg/m      Patient Vitals for the past 24 hrs:   BP Temp Temp src Pulse Resp SpO2 Height Weight   04/14/25 1810 117/75 98.5  F (36.9  C) Tympanic 77 20 100 % 1.727 m (5' 8\") 96.9 kg (213 lb 9.6 oz)       Vital signs reviewed by: Andrea Laboy PA-C    Physical Exam   Constitutional: Patient is alert and cooperative. No acute distress.  GI: Abdomen is soft and non-tender throughout.  Neurological: Alert and oriented x3.  Skin: No rash noted on visualized skin.  Psychiatric:The patient has a normal mood and affect.     Labs/Imaging:  Results for orders placed or performed in visit on 04/14/25   UA Macroscopic with reflex to Microscopic and Culture - Lab Collect     Status: Abnormal    Specimen: Urine, Clean Catch   Result Value Ref Range    Color Urine Yellow Colorless, Straw, Light Yellow, Yellow    Appearance Urine Clear Clear    Glucose Urine Negative Negative mg/dL    Bilirubin Urine Negative Negative    Ketones Urine Negative Negative mg/dL    Specific Gravity Urine 1.025 1.003 - 1.035    Blood Urine Negative Negative    pH Urine 6.0 5.0 - 7.0    Protein Albumin Urine Negative Negative mg/dL    Urobilinogen Urine 0.2 0.2, 1.0 E.U./dL    Nitrite Urine Negative Negative    Leukocyte Esterase Urine Trace (A) Negative   UA Microscopic with Reflex to Culture     Status: Abnormal   Result Value Ref Range    Bacteria Urine Few (A) None Seen /HPF    RBC Urine 0-2 0-2 /HPF /HPF    WBC Urine 0-5 0-5 /HPF /HPF    Squamous Epithelials Urine Many (A) None Seen /LPF    Narrative    Urine Culture not indicated   Wet prep - lab collect     Status: Abnormal    Specimen: Vagina; Swab   Result Value Ref Range    Trichomonas Absent Absent    Yeast " Absent Absent    Clue Cells Absent Absent    WBCs/high power field 1+ (A) None     Andrea Laboy PA-C, April 14, 2025

## 2025-04-15 LAB
C TRACH DNA SPEC QL PROBE+SIG AMP: NEGATIVE
N GONORRHOEA DNA SPEC QL NAA+PROBE: NEGATIVE
SPECIMEN TYPE: NORMAL